# Patient Record
Sex: FEMALE | Race: BLACK OR AFRICAN AMERICAN | NOT HISPANIC OR LATINO | Employment: UNEMPLOYED | URBAN - METROPOLITAN AREA
[De-identification: names, ages, dates, MRNs, and addresses within clinical notes are randomized per-mention and may not be internally consistent; named-entity substitution may affect disease eponyms.]

---

## 2023-07-14 LAB
INR IN PPP BY COAGULATION ASSAY: 1.5 (ref 0.9–1.1)
PROTHROMBIN TIME (PT) IN PPP BY COAGULATION ASSAY: 16.6 SEC (ref 9.8–12.8)

## 2023-10-14 ENCOUNTER — APPOINTMENT (OUTPATIENT)
Dept: RADIOLOGY | Facility: HOSPITAL | Age: 48
End: 2023-10-14
Payer: COMMERCIAL

## 2023-10-14 ENCOUNTER — HOSPITAL ENCOUNTER (INPATIENT)
Facility: HOSPITAL | Age: 48
LOS: 6 days | Discharge: NURSING, REHAB OR LONG-TERM CARE | End: 2023-10-20
Attending: EMERGENCY MEDICINE | Admitting: INTERNAL MEDICINE
Payer: COMMERCIAL

## 2023-10-14 DIAGNOSIS — N30.00 ACUTE CYSTITIS WITHOUT HEMATURIA: ICD-10-CM

## 2023-10-14 DIAGNOSIS — M19.90 ARTHRITIS: ICD-10-CM

## 2023-10-14 DIAGNOSIS — I21.4 NSTEMI (NON-ST ELEVATED MYOCARDIAL INFARCTION) (MULTI): ICD-10-CM

## 2023-10-14 DIAGNOSIS — K59.09 OTHER CONSTIPATION: ICD-10-CM

## 2023-10-14 DIAGNOSIS — Z86.718 HISTORY OF DVT (DEEP VEIN THROMBOSIS): ICD-10-CM

## 2023-10-14 DIAGNOSIS — M79.606 PAIN OF LOWER EXTREMITY, UNSPECIFIED LATERALITY: ICD-10-CM

## 2023-10-14 DIAGNOSIS — I50.9 ACUTE ON CHRONIC CONGESTIVE HEART FAILURE, UNSPECIFIED HEART FAILURE TYPE: ICD-10-CM

## 2023-10-14 DIAGNOSIS — M79.604 PAIN IN BOTH LOWER EXTREMITIES: ICD-10-CM

## 2023-10-14 DIAGNOSIS — F31.70 BIPOLAR AFFECTIVE DISORDER IN REMISSION (MULTI): ICD-10-CM

## 2023-10-14 DIAGNOSIS — R60.0 LOCALIZED EDEMA: ICD-10-CM

## 2023-10-14 DIAGNOSIS — M79.605 PAIN IN BOTH LOWER EXTREMITIES: ICD-10-CM

## 2023-10-14 LAB
ALBUMIN SERPL BCP-MCNC: 4 G/DL (ref 3.4–5)
ALP SERPL-CCNC: 80 U/L (ref 33–110)
ALT SERPL W P-5'-P-CCNC: 12 U/L (ref 7–45)
ANION GAP BLDV CALCULATED.4IONS-SCNC: 10 MMOL/L (ref 10–25)
ANION GAP SERPL CALC-SCNC: 12 MMOL/L (ref 10–20)
AST SERPL W P-5'-P-CCNC: 13 U/L (ref 9–39)
BASE EXCESS BLDV CALC-SCNC: 2.4 MMOL/L (ref -2–3)
BASOPHILS # BLD AUTO: 0.02 X10*3/UL (ref 0–0.1)
BASOPHILS NFR BLD AUTO: 0.4 %
BILIRUB SERPL-MCNC: 0.7 MG/DL (ref 0–1.2)
BNP SERPL-MCNC: <2 PG/ML (ref 0–99)
BODY TEMPERATURE: 37 DEGREES CELSIUS
BUN SERPL-MCNC: 14 MG/DL (ref 6–23)
CA-I BLDV-SCNC: 1.26 MMOL/L (ref 1.1–1.33)
CALCIUM SERPL-MCNC: 9.6 MG/DL (ref 8.6–10.6)
CARDIAC TROPONIN I PNL SERPL HS: 10 NG/L (ref 0–34)
CHLORIDE BLDV-SCNC: 107 MMOL/L (ref 98–107)
CHLORIDE SERPL-SCNC: 109 MMOL/L (ref 98–107)
CO2 SERPL-SCNC: 25 MMOL/L (ref 21–32)
CREAT SERPL-MCNC: 0.81 MG/DL (ref 0.5–1.05)
D DIMER PPP FEU-MCNC: 1135 NG/ML FEU
EOSINOPHIL # BLD AUTO: 0.14 X10*3/UL (ref 0–0.7)
EOSINOPHIL NFR BLD AUTO: 3.1 %
ERYTHROCYTE [DISTWIDTH] IN BLOOD BY AUTOMATED COUNT: 14.1 % (ref 11.5–14.5)
GFR SERPL CREATININE-BSD FRML MDRD: 90 ML/MIN/1.73M*2
GLUCOSE BLDV-MCNC: 112 MG/DL (ref 74–99)
GLUCOSE SERPL-MCNC: 106 MG/DL (ref 74–99)
HCO3 BLDV-SCNC: 28.8 MMOL/L (ref 22–26)
HCT VFR BLD AUTO: 38 % (ref 36–46)
HCT VFR BLD EST: 38 % (ref 36–46)
HGB BLD-MCNC: 12.5 G/DL (ref 12–16)
HGB BLDV-MCNC: 12.7 G/DL (ref 12–16)
IMM GRANULOCYTES # BLD AUTO: 0.01 X10*3/UL (ref 0–0.7)
IMM GRANULOCYTES NFR BLD AUTO: 0.2 % (ref 0–0.9)
LACTATE BLDV-SCNC: 0.9 MMOL/L (ref 0.4–2)
LYMPHOCYTES # BLD AUTO: 1.15 X10*3/UL (ref 1.2–4.8)
LYMPHOCYTES NFR BLD AUTO: 25.7 %
MCH RBC QN AUTO: 30 PG (ref 26–34)
MCHC RBC AUTO-ENTMCNC: 32.9 G/DL (ref 32–36)
MCV RBC AUTO: 91 FL (ref 80–100)
MONOCYTES # BLD AUTO: 0.4 X10*3/UL (ref 0.1–1)
MONOCYTES NFR BLD AUTO: 8.9 %
NEUTROPHILS # BLD AUTO: 2.76 X10*3/UL (ref 1.2–7.7)
NEUTROPHILS NFR BLD AUTO: 61.7 %
NRBC BLD-RTO: 0 /100 WBCS (ref 0–0)
OXYHGB MFR BLDV: 49.9 % (ref 45–75)
PCO2 BLDV: 51 MM HG (ref 41–51)
PH BLDV: 7.36 PH (ref 7.33–7.43)
PLATELET # BLD AUTO: 242 X10*3/UL (ref 150–450)
PMV BLD AUTO: 8.8 FL (ref 7.5–11.5)
PO2 BLDV: 34 MM HG (ref 35–45)
POTASSIUM BLDV-SCNC: 3.7 MMOL/L (ref 3.5–5.3)
POTASSIUM SERPL-SCNC: 3.6 MMOL/L (ref 3.5–5.3)
PROT SERPL-MCNC: 6.9 G/DL (ref 6.4–8.2)
RBC # BLD AUTO: 4.16 X10*6/UL (ref 4–5.2)
SAO2 % BLDV: 51 % (ref 45–75)
SODIUM BLDV-SCNC: 142 MMOL/L (ref 136–145)
SODIUM SERPL-SCNC: 142 MMOL/L (ref 136–145)
WBC # BLD AUTO: 4.5 X10*3/UL (ref 4.4–11.3)

## 2023-10-14 PROCEDURE — 99285 EMERGENCY DEPT VISIT HI MDM: CPT | Mod: 25

## 2023-10-14 PROCEDURE — 85379 FIBRIN DEGRADATION QUANT: CPT

## 2023-10-14 PROCEDURE — 80053 COMPREHEN METABOLIC PANEL: CPT

## 2023-10-14 PROCEDURE — 2550000001 HC RX 255 CONTRASTS: Mod: SE | Performed by: EMERGENCY MEDICINE

## 2023-10-14 PROCEDURE — 2500000004 HC RX 250 GENERAL PHARMACY W/ HCPCS (ALT 636 FOR OP/ED)

## 2023-10-14 PROCEDURE — 36415 COLL VENOUS BLD VENIPUNCTURE: CPT

## 2023-10-14 PROCEDURE — 1210000001 HC SEMI-PRIVATE ROOM DAILY

## 2023-10-14 PROCEDURE — 84132 ASSAY OF SERUM POTASSIUM: CPT | Mod: CMCLAB

## 2023-10-14 PROCEDURE — 71275 CT ANGIOGRAPHY CHEST: CPT | Mod: FR

## 2023-10-14 PROCEDURE — 84484 ASSAY OF TROPONIN QUANT: CPT

## 2023-10-14 PROCEDURE — 80307 DRUG TEST PRSMV CHEM ANLYZR: CPT

## 2023-10-14 PROCEDURE — 81025 URINE PREGNANCY TEST: CPT

## 2023-10-14 PROCEDURE — 93010 ELECTROCARDIOGRAM REPORT: CPT | Performed by: PHYSICIAN ASSISTANT

## 2023-10-14 PROCEDURE — 85025 COMPLETE CBC W/AUTO DIFF WBC: CPT

## 2023-10-14 PROCEDURE — 83880 ASSAY OF NATRIURETIC PEPTIDE: CPT

## 2023-10-14 PROCEDURE — 71045 X-RAY EXAM CHEST 1 VIEW: CPT | Mod: FOREIGN READ | Performed by: RADIOLOGY

## 2023-10-14 PROCEDURE — 83735 ASSAY OF MAGNESIUM: CPT | Mod: CMCLAB

## 2023-10-14 PROCEDURE — 71045 X-RAY EXAM CHEST 1 VIEW: CPT | Mod: FR

## 2023-10-14 PROCEDURE — 71275 CT ANGIOGRAPHY CHEST: CPT | Mod: FOREIGN READ | Performed by: RADIOLOGY

## 2023-10-14 PROCEDURE — 99285 EMERGENCY DEPT VISIT HI MDM: CPT | Mod: 25 | Performed by: EMERGENCY MEDICINE

## 2023-10-14 PROCEDURE — 99285 EMERGENCY DEPT VISIT HI MDM: CPT | Performed by: EMERGENCY MEDICINE

## 2023-10-14 PROCEDURE — 96374 THER/PROPH/DIAG INJ IV PUSH: CPT

## 2023-10-14 PROCEDURE — 2500000001 HC RX 250 WO HCPCS SELF ADMINISTERED DRUGS (ALT 637 FOR MEDICARE OP)

## 2023-10-14 PROCEDURE — 84132 ASSAY OF SERUM POTASSIUM: CPT

## 2023-10-14 RX ORDER — ENOXAPARIN SODIUM 150 MG/ML
0.7 INJECTION SUBCUTANEOUS EVERY 12 HOURS
COMMUNITY
End: 2023-10-14 | Stop reason: SDUPTHER

## 2023-10-14 RX ORDER — TALC
6 POWDER (GRAM) TOPICAL NIGHTLY
Status: DISCONTINUED | OUTPATIENT
Start: 2023-10-14 | End: 2023-10-20 | Stop reason: HOSPADM

## 2023-10-14 RX ORDER — TORSEMIDE 20 MG/1
20 TABLET ORAL DAILY
Status: DISCONTINUED | OUTPATIENT
Start: 2023-10-15 | End: 2023-10-19

## 2023-10-14 RX ORDER — ONDANSETRON HYDROCHLORIDE 2 MG/ML
4 INJECTION, SOLUTION INTRAVENOUS ONCE
Status: COMPLETED | OUTPATIENT
Start: 2023-10-14 | End: 2023-10-14

## 2023-10-14 RX ORDER — METOCLOPRAMIDE HYDROCHLORIDE 5 MG/ML
INJECTION INTRAMUSCULAR; INTRAVENOUS
Status: COMPLETED
Start: 2023-10-14 | End: 2023-10-14

## 2023-10-14 RX ORDER — BUPROPION HYDROCHLORIDE 150 MG/1
150 TABLET ORAL DAILY
Status: DISCONTINUED | OUTPATIENT
Start: 2023-10-15 | End: 2023-10-20 | Stop reason: HOSPADM

## 2023-10-14 RX ORDER — TORSEMIDE 20 MG/1
20 TABLET ORAL DAILY
Status: DISCONTINUED | OUTPATIENT
Start: 2023-10-15 | End: 2023-10-14

## 2023-10-14 RX ORDER — HYDROXYZINE HYDROCHLORIDE 25 MG/1
25 TABLET, FILM COATED ORAL EVERY 6 HOURS PRN
Status: DISCONTINUED | OUTPATIENT
Start: 2023-10-14 | End: 2023-10-20 | Stop reason: HOSPADM

## 2023-10-14 RX ORDER — OXCARBAZEPINE 300 MG/1
300 TABLET, FILM COATED ORAL DAILY
COMMUNITY
Start: 2022-10-20 | End: 2023-10-14 | Stop reason: SDUPTHER

## 2023-10-14 RX ORDER — TALC
6 POWDER (GRAM) TOPICAL NIGHTLY
Status: ON HOLD | COMMUNITY
End: 2023-10-20 | Stop reason: SDUPTHER

## 2023-10-14 RX ORDER — ARIPIPRAZOLE 10 MG/1
10 TABLET ORAL DAILY
Status: DISCONTINUED | OUTPATIENT
Start: 2023-10-15 | End: 2023-10-20 | Stop reason: HOSPADM

## 2023-10-14 RX ORDER — BUPROPION HYDROCHLORIDE 150 MG/1
150 TABLET ORAL DAILY
Status: ON HOLD | COMMUNITY
Start: 2022-06-09 | End: 2023-10-20 | Stop reason: SDUPTHER

## 2023-10-14 RX ORDER — DOCUSATE SODIUM 100 MG/1
100 CAPSULE, LIQUID FILLED ORAL 2 TIMES DAILY PRN
COMMUNITY
Start: 2023-07-12 | End: 2023-10-20 | Stop reason: HOSPADM

## 2023-10-14 RX ORDER — CARVEDILOL 3.12 MG/1
3.12 TABLET ORAL
Status: DISCONTINUED | OUTPATIENT
Start: 2023-10-15 | End: 2023-10-20 | Stop reason: HOSPADM

## 2023-10-14 RX ORDER — OXCARBAZEPINE 300 MG/1
300 TABLET, FILM COATED ORAL NIGHTLY
Status: DISCONTINUED | OUTPATIENT
Start: 2023-10-14 | End: 2023-10-20 | Stop reason: HOSPADM

## 2023-10-14 RX ORDER — GABAPENTIN 300 MG/1
300 CAPSULE ORAL EVERY 12 HOURS
COMMUNITY
Start: 2023-09-20 | End: 2023-10-20 | Stop reason: HOSPADM

## 2023-10-14 RX ORDER — WARFARIN 10 MG/1
15 TABLET ORAL
COMMUNITY
Start: 2023-10-01 | End: 2023-10-20 | Stop reason: HOSPADM

## 2023-10-14 RX ORDER — GABAPENTIN 300 MG/1
300 CAPSULE ORAL EVERY 12 HOURS
Status: DISCONTINUED | OUTPATIENT
Start: 2023-10-14 | End: 2023-10-17

## 2023-10-14 RX ORDER — ONDANSETRON HYDROCHLORIDE 2 MG/ML
INJECTION, SOLUTION INTRAVENOUS
Status: COMPLETED
Start: 2023-10-14 | End: 2023-10-14

## 2023-10-14 RX ORDER — CARVEDILOL 3.12 MG/1
3.12 TABLET ORAL
Status: ON HOLD | COMMUNITY
Start: 2022-08-26 | End: 2023-10-20 | Stop reason: SDUPTHER

## 2023-10-14 RX ORDER — WARFARIN 10 MG/1
10 TABLET ORAL DAILY
Status: DISCONTINUED | OUTPATIENT
Start: 2023-10-15 | End: 2023-10-19

## 2023-10-14 RX ORDER — ATORVASTATIN CALCIUM 40 MG/1
40 TABLET, FILM COATED ORAL DAILY
COMMUNITY
End: 2023-10-20 | Stop reason: HOSPADM

## 2023-10-14 RX ORDER — ACETAMINOPHEN 325 MG/1
975 TABLET ORAL EVERY 8 HOURS PRN
Status: DISCONTINUED | OUTPATIENT
Start: 2023-10-14 | End: 2023-10-20 | Stop reason: HOSPADM

## 2023-10-14 RX ORDER — ACETAMINOPHEN 325 MG/1
650 TABLET ORAL ONCE
Status: COMPLETED | OUTPATIENT
Start: 2023-10-14 | End: 2023-10-14

## 2023-10-14 RX ORDER — ATORVASTATIN CALCIUM 20 MG/1
40 TABLET, FILM COATED ORAL DAILY
Status: DISCONTINUED | OUTPATIENT
Start: 2023-10-15 | End: 2023-10-16

## 2023-10-14 RX ORDER — MORPHINE SULFATE 4 MG/ML
4 INJECTION INTRAVENOUS ONCE
Status: COMPLETED | OUTPATIENT
Start: 2023-10-14 | End: 2023-10-14

## 2023-10-14 RX ORDER — FUROSEMIDE 10 MG/ML
40 INJECTION INTRAMUSCULAR; INTRAVENOUS EVERY 12 HOURS
Status: COMPLETED | OUTPATIENT
Start: 2023-10-14 | End: 2023-10-17

## 2023-10-14 RX ORDER — SPIRONOLACTONE 25 MG/1
25 TABLET ORAL DAILY
Status: DISCONTINUED | OUTPATIENT
Start: 2023-10-15 | End: 2023-10-20 | Stop reason: HOSPADM

## 2023-10-14 RX ORDER — FUROSEMIDE 10 MG/ML
80 INJECTION INTRAMUSCULAR; INTRAVENOUS ONCE
Status: COMPLETED | OUTPATIENT
Start: 2023-10-14 | End: 2023-10-14

## 2023-10-14 RX ORDER — METOCLOPRAMIDE HYDROCHLORIDE 5 MG/ML
10 INJECTION INTRAMUSCULAR; INTRAVENOUS ONCE
Status: COMPLETED | OUTPATIENT
Start: 2023-10-14 | End: 2023-10-14

## 2023-10-14 RX ORDER — TORSEMIDE 20 MG/1
20 TABLET ORAL DAILY
Status: ON HOLD | COMMUNITY
Start: 2022-10-21 | End: 2023-10-20 | Stop reason: SDUPTHER

## 2023-10-14 RX ORDER — POTASSIUM CHLORIDE 14.9 MG/ML
20 INJECTION INTRAVENOUS ONCE
Status: COMPLETED | OUTPATIENT
Start: 2023-10-14 | End: 2023-10-15

## 2023-10-14 RX ORDER — ARIPIPRAZOLE 10 MG/1
10 TABLET ORAL DAILY
Status: ON HOLD | COMMUNITY
Start: 2023-09-21 | End: 2023-10-20 | Stop reason: SDUPTHER

## 2023-10-14 RX ORDER — CLOPIDOGREL BISULFATE 75 MG/1
75 TABLET ORAL DAILY
Status: ON HOLD | COMMUNITY
Start: 2023-06-10 | End: 2023-10-20 | Stop reason: SDUPTHER

## 2023-10-14 RX ORDER — ALBUTEROL SULFATE 90 UG/1
2 INHALANT RESPIRATORY (INHALATION) EVERY 6 HOURS PRN
Status: DISCONTINUED | OUTPATIENT
Start: 2023-10-14 | End: 2023-10-20 | Stop reason: HOSPADM

## 2023-10-14 RX ORDER — CLOPIDOGREL BISULFATE 75 MG/1
75 TABLET ORAL DAILY
Status: DISCONTINUED | OUTPATIENT
Start: 2023-10-15 | End: 2023-10-20 | Stop reason: HOSPADM

## 2023-10-14 RX ORDER — ENOXAPARIN SODIUM 150 MG/ML
1 INJECTION SUBCUTANEOUS EVERY 12 HOURS
Status: DISCONTINUED | OUTPATIENT
Start: 2023-10-14 | End: 2023-10-20 | Stop reason: HOSPADM

## 2023-10-14 RX ORDER — ALBUTEROL SULFATE 90 UG/1
2 INHALANT RESPIRATORY (INHALATION) EVERY 6 HOURS PRN
Status: ON HOLD | COMMUNITY
Start: 2018-06-20 | End: 2023-10-20 | Stop reason: SDUPTHER

## 2023-10-14 RX ORDER — HYDROXYZINE HYDROCHLORIDE 25 MG/1
25 TABLET, FILM COATED ORAL EVERY 6 HOURS PRN
COMMUNITY
Start: 2023-09-20 | End: 2023-10-20 | Stop reason: HOSPADM

## 2023-10-14 RX ORDER — SPIRONOLACTONE 25 MG/1
25 TABLET ORAL DAILY
Status: ON HOLD | COMMUNITY
Start: 2022-06-09 | End: 2023-10-20 | Stop reason: SDUPTHER

## 2023-10-14 RX ADMIN — POTASSIUM CHLORIDE 20 MEQ: 14.9 INJECTION, SOLUTION INTRAVENOUS at 22:56

## 2023-10-14 RX ADMIN — MORPHINE SULFATE 4 MG: 4 INJECTION INTRAVENOUS at 20:05

## 2023-10-14 RX ADMIN — MELATONIN 6 MG: 3 TAB ORAL at 22:55

## 2023-10-14 RX ADMIN — METOCLOPRAMIDE HYDROCHLORIDE 10 MG: 5 INJECTION INTRAMUSCULAR; INTRAVENOUS at 20:47

## 2023-10-14 RX ADMIN — ONDANSETRON HYDROCHLORIDE 4 MG: 2 INJECTION, SOLUTION INTRAVENOUS at 20:25

## 2023-10-14 RX ADMIN — METOCLOPRAMIDE 10 MG: 5 INJECTION, SOLUTION INTRAMUSCULAR; INTRAVENOUS at 20:47

## 2023-10-14 RX ADMIN — ACETAMINOPHEN 650 MG: 325 TABLET ORAL at 13:04

## 2023-10-14 RX ADMIN — GABAPENTIN 300 MG: 300 CAPSULE ORAL at 22:55

## 2023-10-14 RX ADMIN — ONDANSETRON 4 MG: 2 INJECTION INTRAMUSCULAR; INTRAVENOUS at 20:06

## 2023-10-14 RX ADMIN — FUROSEMIDE 80 MG: 10 INJECTION, SOLUTION INTRAVENOUS at 11:38

## 2023-10-14 RX ADMIN — ENOXAPARIN SODIUM 120 MG: 150 INJECTION SUBCUTANEOUS at 20:48

## 2023-10-14 RX ADMIN — ONDANSETRON 4 MG: 2 INJECTION INTRAMUSCULAR; INTRAVENOUS at 20:25

## 2023-10-14 RX ADMIN — ACETAMINOPHEN 975 MG: 325 TABLET ORAL at 22:55

## 2023-10-14 RX ADMIN — IOHEXOL 90 ML: 350 INJECTION, SOLUTION INTRAVENOUS at 12:57

## 2023-10-14 RX ADMIN — FUROSEMIDE 40 MG: 10 INJECTION, SOLUTION INTRAVENOUS at 22:56

## 2023-10-14 RX ADMIN — OXCARBAZEPINE 300 MG: 300 TABLET, FILM COATED ORAL at 23:27

## 2023-10-14 ASSESSMENT — ENCOUNTER SYMPTOMS
HEADACHES: 0
NECK PAIN: 0
VOMITING: 0
DIARRHEA: 0
ABDOMINAL PAIN: 0
FEVER: 0
FATIGUE: 0
RHINORRHEA: 0
TROUBLE SWALLOWING: 0
CONSTIPATION: 0
TREMORS: 0
DIZZINESS: 0
COLOR CHANGE: 0
FLANK PAIN: 0
STRIDOR: 0
SORE THROAT: 0
SHORTNESS OF BREATH: 1
ABDOMINAL DISTENTION: 0
BACK PAIN: 0
FACIAL ASYMMETRY: 0
WHEEZING: 0
NAUSEA: 0

## 2023-10-14 ASSESSMENT — PAIN DESCRIPTION - ORIENTATION: ORIENTATION: RIGHT;LEFT

## 2023-10-14 ASSESSMENT — PAIN DESCRIPTION - ONSET: ONSET: ONGOING

## 2023-10-14 ASSESSMENT — PAIN DESCRIPTION - DESCRIPTORS: DESCRIPTORS: ACHING;SHARP

## 2023-10-14 ASSESSMENT — COLUMBIA-SUICIDE SEVERITY RATING SCALE - C-SSRS
1. IN THE PAST MONTH, HAVE YOU WISHED YOU WERE DEAD OR WISHED YOU COULD GO TO SLEEP AND NOT WAKE UP?: NO
2. HAVE YOU ACTUALLY HAD ANY THOUGHTS OF KILLING YOURSELF?: NO
6. HAVE YOU EVER DONE ANYTHING, STARTED TO DO ANYTHING, OR PREPARED TO DO ANYTHING TO END YOUR LIFE?: NO

## 2023-10-14 ASSESSMENT — PAIN SCALES - GENERAL
PAINLEVEL_OUTOF10: 10 - WORST POSSIBLE PAIN
PAINLEVEL_OUTOF10: 8
PAINLEVEL_OUTOF10: 10 - WORST POSSIBLE PAIN

## 2023-10-14 ASSESSMENT — PAIN DESCRIPTION - FREQUENCY: FREQUENCY: CONSTANT/CONTINUOUS

## 2023-10-14 ASSESSMENT — PAIN DESCRIPTION - PAIN TYPE: TYPE: ACUTE PAIN

## 2023-10-14 ASSESSMENT — PAIN - FUNCTIONAL ASSESSMENT: PAIN_FUNCTIONAL_ASSESSMENT: 0-10

## 2023-10-14 ASSESSMENT — PAIN DESCRIPTION - LOCATION: LOCATION: LEG

## 2023-10-14 NOTE — ED PROVIDER NOTES
HPI   Chief Complaint   Patient presents with    Leg Pain    Leg Swelling     Patient reports to the ED via [facility] d/t leg swelling and pain. Patient states she has a hx of CHF and DVT's. Her legs have been swollen and weeping for 4 days and are painful. Patient also complains of Head ache but denies dizziness, N/V, CP,SOB.       48-year-old female presenting to the emergency department with concern for leg swelling and shortness of breath.  Patient has a history of CHF and DVTs.  Has noticed an increase in swelling over the past 4 days, is complaining of weeping fluid and painful swelling of the bilateral lower extremities.  States she is supposed to be on anticoagulation but due to family dynamics as well as insurance concerns, patient has not been taking her scheduled medications for some time.  Patient is also endorsing shortness of breath as she feels fluid overloaded.  Patient states she has not been able to walk because the pain and from swelling is severe as well as worsening heaviness.  Denies lightheadedness, vision changes.  Is complaining of a headache.  Denies fever, chills, chest pain, no nausea vomiting or diarrhea.  No abdominal pain.  No urinary symptoms including blood in the urine or dysuria, no blood in the stool.  Patient states she is not able to ambulate to the bathroom and has urinated on herself because her legs are too swollen and painful to use.                          [place] Coma Scale Score: 15                  Patient History   No past medical history on file.  Past Surgical History:   Procedure Laterality Date    CT ABDOMEN PELVIS ANGIOGRAM W AND/OR WO IV CONTRAST  12/11/2021    CT ABDOMEN PELVIS ANGIOGRAM W AND/OR WO IV CONTRAST 12/11/2021 [facility] [place] [facility]    CT ANGIO HEART CORONARY  11/30/2021    CT HEART CORONARY ANGIOGRAM 11/30/2021 [facility] [place] [facility]     No family history on file.  Social History     Tobacco Use    Smoking status: Not on file    Smokeless tobacco: Not on  file   Substance Use Topics    Alcohol use: Not on file    Drug use: Not on file       Physical Exam   ED Triage Vitals [10/14/23 0915]   Temp Heart Rate Resp BP   36.6 °C (97.9 °F) 74 16 119/77      SpO2 Temp Source Heart Rate Source Patient Position   96 % Oral Monitor Sitting      BP Location FiO2 (%)     Left arm --       Physical Exam  Vitals and nursing note reviewed.   Constitutional:       General: She is not in acute distress.     Appearance: Normal appearance. She is well-developed. She is not toxic-appearing.   HENT:      Head: Normocephalic and atraumatic.      Mouth/Throat:      Mouth: Mucous membranes are moist.   Eyes:      Conjunctiva/sclera: Conjunctivae normal.      Pupils: Pupils are equal, round, and reactive to light.   Cardiovascular:      Rate and Rhythm: Normal rate and regular rhythm.      Pulses:           Radial pulses are 2+ on the right side and 2+ on the left side.      Heart sounds: No murmur heard.  Pulmonary:      Effort: Pulmonary effort is normal. No respiratory distress.      Breath sounds: Normal breath sounds.   Abdominal:      General: Abdomen is flat.      Palpations: Abdomen is soft.      Tenderness: There is no abdominal tenderness. There is no guarding or rebound.   Musculoskeletal:         General: Tenderness present. No swelling.      Cervical back: Normal range of motion and neck supple.      Right lower leg: Edema present.      Left lower leg: Edema present.      Comments: 3+ pitting edema bilaterally   Skin:     General: Skin is warm and dry.      Capillary Refill: Capillary refill takes less than 2 seconds.   Neurological:      Mental Status: She is alert and oriented to person, place, and time.   Psychiatric:         Mood and Affect: Mood normal.         ED Course & MDM   ED Course as of 10/14/23 1444   Sat Oct 14, 2023   1410 VBG within normal limits, troponin and BNP within normal limits.  CBC unremarkable, CMP unremarkable.  D-dimer elevated to 1135. [PW]   1412  Chest x-ray negative for evidence of interstitial edema or fluid overload. [PW]   1412 CT PE study negative for any evidence of pneumonia, consolidation, pulmonary embolism. [PW]      ED Course User Index  [PW] Yolanda Young DO         Diagnoses as of 10/14/23 1444   Acute on chronic congestive heart failure, unspecified heart failure type (CMS/HCC)       Medical Decision Making  This is a 47 y/o female patient with history of heart failure, presenting with likely exacerbation of CHF causing volume overload and bilateral LE edema. The etiology of the decompensation is not certain but is likely due to lack of access to medicine. Alternative etiologies I considered include cardiac (ACS, valvular disease, arrhythmia, myocarditis/endocarditis, dissection) however given unremarkable trop, ekg, cardiac exam have low suspicion. Also considered but low risk for respiratory cause (COPD, asthma, PE, or PNA), or dietary indiscretion, alcohol or drug abuse, endocrine (thyrotoxicosis). The patient was given lasix 80 mg IV and admitted for acute management of CHF exacerbation.  Lab work was largely unremarkable however she normally should be taking anticoagulation as she has a risk factors for DVT and PE but has not been on her medications due to to social related factors.  Dimer was 1135 so a CT PE study was ordered which was negative for pulmonary edema, PE, evidence of consolidation or pneumonia.  No pleural effusions.  Given her lack of access to medication and follow-up care, patient admitted for IV diuresis, and further management.        Procedure  Procedures     Yolanda Young DO  Resident  10/14/23 1444

## 2023-10-14 NOTE — H&P
History Of Present Illness  Manuel Palmer is a 48 y.o. female with PMH of CHF, previous TIA/NSTEMI, DVTs/PE (on Warfarin, Lovenox and Plavix), Hx of cocaine abuse, ?schizoaffective/bipolar disease, seizure, HTN, DM, DLD, COPD, presenting to ED on 10/14 with shortness of breath and bilateral leg swelling and pain for 4 days. She did have these symptoms for 6 months, but they gradually worsened to the extent where she couldn't handle it. The pain was over the bilateral lower legs, scaled as 10 out of 10, but not responsive to Tylenol. She felt short of breath when moving around at baseline, but the condition worsened over the past 4 days as well. She reported family not taking care of her, leading to her not taking medications for the past 3 weeks as she ran out. She denied chest pain, dizziness, headache, nausea, vomiting, diarrhea, and constipation.     She reported an experience of TIA and STEMI diagnosed in [place], [place] 3 years ago (cheart review revealed NSTEMI). Her initial presentation was left chest pain, tingling in the left arm, and left facial drooping. Heart cath showed 50 % blockage in the heart vessels. No surgery or stent was placed then.     ED intervention:  - Lasix 80 mg  - Tylenol 650 mg       Past Medical History  No past medical history on file.    CHF, diagnosed over 30 years ago (when 15-17 yo)  Previous TIA/STEMI, 3 years ago  DVTs/PE on Warfarin, Lovenox, and Plavix  Cocaine abuse  Bipolar disease  Seizure   HTN  DM  DLD  COPD    Surgical History  Past Surgical History:   Procedure Laterality Date    CT ABDOMEN PELVIS ANGIOGRAM W AND/OR WO IV CONTRAST  12/11/2021    CT ABDOMEN PELVIS ANGIOGRAM W AND/OR WO IV CONTRAST [place] [facility]    CT ANGIO HEART CORONARY  11/30/2021    CT HEART CORONARY ANGIOGRAM [place]/2021 [facility]     Appendectomy  Cholecystectomy  Caesarean section  Hernia removal, when she was young  bunionectomy       Social History  She has no  history on file for tobacco use, alcohol use, and drug use.    Alcohol: occasionally  Tobacco: quitted months ago, 66 pack-years  Drug: denied, cocaine abuse history    Family History  No family history on file.    Mom: HTN, DM, heart disease  Dad: DM,  of lung cancer     Allergies  Aspirin and Haldol [haloperidol]    Review of Systems   Constitutional:  Negative for fatigue and fever.   HENT:  Negative for congestion, rhinorrhea, sore throat and trouble swallowing.    Respiratory:  Positive for shortness of breath. Negative for wheezing and stridor.    Cardiovascular:  Positive for leg swelling. Negative for chest pain.   Gastrointestinal:  Negative for abdominal distention, abdominal pain, constipation, diarrhea, nausea and vomiting.   Genitourinary:  Negative for flank pain.   Musculoskeletal:  Negative for back pain and neck pain.   Skin:  Negative for color change and rash.   Neurological:  Negative for dizziness, tremors, facial asymmetry and headaches.        Physical Exam  Constitutional:       Appearance: She is obese.   HENT:      Mouth/Throat:      Mouth: Mucous membranes are moist.   Eyes:      General: No scleral icterus.     Extraocular Movements: Extraocular movements intact.      Pupils: Pupils are equal, round, and reactive to light.   Cardiovascular:      Rate and Rhythm: Normal rate and regular rhythm.      Heart sounds: No murmur heard.     No friction rub. No gallop.   Pulmonary:      Breath sounds: No stridor. No wheezing, rhonchi or rales.   Abdominal:      Palpations: Abdomen is soft.      Tenderness: There is no guarding or rebound.   Musculoskeletal:         General: Swelling and tenderness present.      Right lower le+ Pitting Edema present.      Left lower le+ Pitting Edema present.   Skin:     Coloration: Skin is not pale.      Findings: No rash.   Neurological:      Mental Status: She is alert and oriented to person, place, and time.   Psychiatric:         Mood and Affect:  "Mood normal.         Behavior: Behavior normal.     Home Medications  atorvastatin 40 mg PO daily  ARIPiprazole 10 mg PO daily  buPROPion 150 mg PO daily  melatonin 6 mg PO daily  carvedilol 3.125 mg PO BID  spironolactone 25 mg PO daily  OXcarbazepine 300 mg PO daily  clopidogrel 75 mg PO daily  torsemide 20 mg PO daily  warfarin (COUMADIN) 6 mg PO daily  enoxaparin (LOVENOX) 100mg/mL SC injection 2 mL q12h    albuterol 90 mcg IH q6h PRN  docusate sodium 100 mg PO BID PRN  baclofen 10 mg PO TID PRN  gabapentin 300 mg PO q12h PRN  hydrOXYzine HC 25 mg PO q6h PRN  traMADol 50 mg q6h PRN       Last Recorded Vitals  Blood pressure 119/77, pulse 74, temperature 36.6 °C (97.9 °F), temperature source Oral, resp. rate 16, height 1.753 m (5' 9\"), weight 122 kg (270 lb), SpO2 96 %.    Relevant Results  Results for orders placed or performed during the hospital encounter of 10/14/23 (from the past 24 hour(s))   Blood Gas Venous Full Panel Unsolicited   Result Value Ref Range    POCT pH, Venous 7.36 7.33 - 7.43 pH    POCT pCO2, Venous 51 41 - 51 mm Hg    POCT pO2, Venous 34 (L) 35 - 45 mm Hg    POCT SO2, Venous 51 45 - 75 %    POCT Oxy Hemoglobin, Venous 49.9 45.0 - 75.0 %    POCT Hematocrit Calculated, Venous 38.0 36.0 - 46.0 %    POCT Sodium, Venous 142 136 - 145 mmol/L    POCT Potassium, Venous 3.7 3.5 - 5.3 mmol/L    POCT Chloride, Venous 107 98 - 107 mmol/L    POCT Ionized Calicum, Venous 1.26 1.10 - 1.33 mmol/L    POCT Glucose, Venous 112 (H) 74 - 99 mg/dL    POCT Lactate, Venous 0.9 0.4 - 2.0 mmol/L    POCT Base Excess, Venous 2.4 -2.0 - 3.0 mmol/L    POCT HCO3 Calculated, Venous 28.8 (H) 22.0 - 26.0 mmol/L    POCT Hemoglobin, Venous 12.7 12.0 - 16.0 g/dL    POCT Anion Gap, Venous 10.0 10.0 - 25.0 mmol/L    Patient Temperature 37.0 degrees Celsius   CBC and Auto Differential   Result Value Ref Range    WBC 4.5 4.4 - 11.3 x10*3/uL    nRBC 0.0 0.0 - 0.0 /100 WBCs    RBC 4.16 4.00 - 5.20 x10*6/uL    Hemoglobin 12.5 12.0 - " 16.0 g/dL    Hematocrit 38.0 36.0 - 46.0 %    MCV 91 80 - 100 fL    MCH 30.0 26.0 - 34.0 pg    MCHC 32.9 32.0 - 36.0 g/dL    RDW 14.1 11.5 - 14.5 %    Platelets 242 150 - 450 x10*3/uL    MPV 8.8 7.5 - 11.5 fL    Neutrophils % 61.7 40.0 - 80.0 %    Immature Granulocytes %, Automated 0.2 0.0 - 0.9 %    Lymphocytes % 25.7 13.0 - 44.0 %    Monocytes % 8.9 2.0 - 10.0 %    Eosinophils % 3.1 0.0 - 6.0 %    Basophils % 0.4 0.0 - 2.0 %    Neutrophils Absolute 2.76 1.20 - 7.70 x10*3/uL    Immature Granulocytes Absolute, Automated 0.01 0.00 - 0.70 x10*3/uL    Lymphocytes Absolute 1.15 (L) 1.20 - 4.80 x10*3/uL    Monocytes Absolute 0.40 0.10 - 1.00 x10*3/uL    Eosinophils Absolute 0.14 0.00 - 0.70 x10*3/uL    Basophils Absolute 0.02 0.00 - 0.10 x10*3/uL   Comprehensive metabolic panel   Result Value Ref Range    Glucose 106 (H) 74 - 99 mg/dL    Sodium 142 136 - 145 mmol/L    Potassium 3.6 3.5 - 5.3 mmol/L    Chloride 109 (H) 98 - 107 mmol/L    Bicarbonate 25 21 - 32 mmol/L    Anion Gap 12 10 - 20 mmol/L    Urea Nitrogen 14 6 - 23 mg/dL    Creatinine 0.81 0.50 - 1.05 mg/dL    eGFR 90 >60 mL/min/1.73m*2    Calcium 9.6 8.6 - 10.6 mg/dL    Albumin 4.0 3.4 - 5.0 g/dL    Alkaline Phosphatase 80 33 - 110 U/L    Total Protein 6.9 6.4 - 8.2 g/dL    AST 13 9 - 39 U/L    Bilirubin, Total 0.7 0.0 - 1.2 mg/dL    ALT 12 7 - 45 U/L   Troponin I, High Sensitivity   Result Value Ref Range    Troponin I, High Sensitivity 10 0 - 34 ng/L   B-Type Natriuretic Peptide   Result Value Ref Range    BNP <2 0 - 99 pg/mL   D-Dimer, Quantitative Non VTE   Result Value Ref Range    D-Dimer Non VTE, Quant (ng/mL FEU) 1,135 (H) <=500 ng/mL FEU     CT angio chest for pulmonary embolism  Result Date: 10/14/2023    FINDINGS: Pulmonary arteries are adequately opacified without acute or chronic filling defects.  The thoracic aorta is normal in course and caliber without dissection or aneurysm. The heart is normal in size without pericardial effusion.  Thoracic  lymph nodes are not enlarged. There is no pleural effusion, pleural thickening, or pneumothorax. The airways are patent. Lungs are clear without consolidation, interstitial disease, or suspicious nodules. Upper abdomen demonstrates no acute pathology. There are no acute fractures.  No suspicious bony lesions.    No pulmonary embolism or other acute intrathoracic process. Signed by Robin Kulkarni MD    XR chest 1 view  Result Date: 10/14/2023    FINDINGS: CARDIOMEDIASTINAL SILHOUETTE: Cardiomediastinal silhouette is normal in size and configuration.  LUNGS: Lungs are clear.  ABDOMEN: No remarkable upper abdominal findings.  BONES: No acute osseous changes.    No acute process. Signed by Robin Kulkarni MD         Assessment/Plan   Principal Problem:    Acute on chronic congestive heart failure, unspecified heart failure type (CMS/HCC)    Manuel Palmer is a 48-year-old female with the PMH of CHF (on Torsemide, Carvedilol, Spironolactone), previous TIA/NSTEMI, DVTs/PE (on Warfarin, Lovenox and Plavix), cocaine abuse, bipolar disease (on Aripiprazole & Oxcarbazepine), HTN,, DLD (on Atrovastatin), ?COPD, presenting to ED on 10/14 with shortness of breath and bilateral leg swelling and pain for 4 days. At ED, D-dimer was 1135. CXR was negative for pulmonary edema. CT PE showed no evidence of PE or PNA. Considering Patient's not taking medications for 3 weeks, the symptoms are most likely caused by acute exacerbation of CHF without medication control. Will diurese monitor I/Os     #bilateral lower leg edema  #sob  #CHF with ADHF  :: possibly caused by not taking medications, no other triggers identified, no ACS symptoms, no flu symptoms,   :: CXR was negative for pulmonary edema  :: CT PE showed no evidence of PE or PNA  -diuresis with lasix (received 80mg IV in the ED)  -resume home torsemide 20mg daily once improved   -hold home carvedilol 3.125mg BID for normal tension and concern for ADHF, consider resumption in  AM    #HFpEF  #CAD   #Hx of NSTEMI   -continue plavix (allergic to aspirin)   -continue atorvastatin 40mg daily   -hold home carvedilol 3.125mg BID for normal tension and concern for ADHF, consider resumption in AM  -continue spironolactone 25mg daily   -consider SGLT-2 inhibitor in the AM   -consider TTE (though will likely be technically difficult)       #Previous multiple DVTs/PE  - lovenox 1mg/kg BID (120mg BID)   - LMWH level (Anti-Xa) 4 hours after 3rd dose   - consider warfarin resumption in the AM   - consider vascular medicine consult     #Hyperlipidemia  - c/w home atorvastatin 40mg daily     #Bipolar disease/?Schizoaffective disorder   - c/w home aripiprazole  - c/w home oxcarbazepine  - c/w home buproprion     F: Diuresing  E: PRN  N: regular diet  A: PIV  DVT Ppx: Lovenox (therapeutic) given previous DVT/PE  GI Ppx: not indicated    Code status: FULL CODE  Surrogate decision maker: (Mom) Tejal [phone] (lives out of state). Patient stated does not want family members updated. However mom is SDM in case she looses capacity.            Nuno Alberts

## 2023-10-15 LAB
ALBUMIN SERPL BCP-MCNC: 3.4 G/DL (ref 3.4–5)
AMPHETAMINES UR QL SCN: ABNORMAL
ANION GAP SERPL CALC-SCNC: 12 MMOL/L (ref 10–20)
APTT PPP: 36 SECONDS (ref 27–38)
BARBITURATES UR QL SCN: ABNORMAL
BASOPHILS # BLD AUTO: 0.03 X10*3/UL (ref 0–0.1)
BASOPHILS NFR BLD AUTO: 0.7 %
BENZODIAZ UR QL SCN: ABNORMAL
BUN SERPL-MCNC: 13 MG/DL (ref 6–23)
BZE UR QL SCN: ABNORMAL
CALCIUM SERPL-MCNC: 9 MG/DL (ref 8.6–10.6)
CANNABINOIDS UR QL SCN: ABNORMAL
CHLORIDE SERPL-SCNC: 108 MMOL/L (ref 98–107)
CHOLEST SERPL-MCNC: 151 MG/DL (ref 0–199)
CHOLESTEROL/HDL RATIO: 4
CO2 SERPL-SCNC: 28 MMOL/L (ref 21–32)
CREAT SERPL-MCNC: 0.72 MG/DL (ref 0.5–1.05)
EOSINOPHIL # BLD AUTO: 0.18 X10*3/UL (ref 0–0.7)
EOSINOPHIL NFR BLD AUTO: 4.3 %
ERYTHROCYTE [DISTWIDTH] IN BLOOD BY AUTOMATED COUNT: 13.6 % (ref 11.5–14.5)
EST. AVERAGE GLUCOSE BLD GHB EST-MCNC: 114 MG/DL
FENTANYL+NORFENTANYL UR QL SCN: ABNORMAL
GFR SERPL CREATININE-BSD FRML MDRD: >90 ML/MIN/1.73M*2
GLUCOSE SERPL-MCNC: 95 MG/DL (ref 74–99)
HBA1C MFR BLD: 5.6 %
HCG UR QL IA.RAPID: NEGATIVE
HCT VFR BLD AUTO: 35.7 % (ref 36–46)
HDLC SERPL-MCNC: 37.3 MG/DL
HGB BLD-MCNC: 11.8 G/DL (ref 12–16)
IMM GRANULOCYTES # BLD AUTO: 0.04 X10*3/UL (ref 0–0.7)
IMM GRANULOCYTES NFR BLD AUTO: 1 % (ref 0–0.9)
INR PPP: 1 (ref 0.9–1.1)
LDLC SERPL CALC-MCNC: 103 MG/DL
LYMPHOCYTES # BLD AUTO: 1.37 X10*3/UL (ref 1.2–4.8)
LYMPHOCYTES NFR BLD AUTO: 32.8 %
MAGNESIUM SERPL-MCNC: 1.85 MG/DL (ref 1.6–2.4)
MAGNESIUM SERPL-MCNC: 2.43 MG/DL (ref 1.6–2.4)
MCH RBC QN AUTO: 30.4 PG (ref 26–34)
MCHC RBC AUTO-ENTMCNC: 33.1 G/DL (ref 32–36)
MCV RBC AUTO: 92 FL (ref 80–100)
MONOCYTES # BLD AUTO: 0.41 X10*3/UL (ref 0.1–1)
MONOCYTES NFR BLD AUTO: 9.8 %
NEUTROPHILS # BLD AUTO: 2.15 X10*3/UL (ref 1.2–7.7)
NEUTROPHILS NFR BLD AUTO: 51.4 %
NON HDL CHOLESTEROL: 114 MG/DL (ref 0–149)
NRBC BLD-RTO: 0 /100 WBCS (ref 0–0)
OPIATES UR QL SCN: ABNORMAL
OXYCODONE+OXYMORPHONE UR QL SCN: ABNORMAL
PCP UR QL SCN: ABNORMAL
PHOSPHATE SERPL-MCNC: 4.1 MG/DL (ref 2.5–4.9)
PLATELET # BLD AUTO: 240 X10*3/UL (ref 150–450)
PMV BLD AUTO: 9 FL (ref 7.5–11.5)
POTASSIUM SERPL-SCNC: 3.6 MMOL/L (ref 3.5–5.3)
PROTHROMBIN TIME: 11.3 SECONDS (ref 9.8–12.8)
RBC # BLD AUTO: 3.88 X10*6/UL (ref 4–5.2)
SODIUM SERPL-SCNC: 144 MMOL/L (ref 136–145)
TRIGL SERPL-MCNC: 54 MG/DL (ref 0–149)
VLDL: 11 MG/DL (ref 0–40)
WBC # BLD AUTO: 4.2 X10*3/UL (ref 4.4–11.3)

## 2023-10-15 PROCEDURE — 2500000004 HC RX 250 GENERAL PHARMACY W/ HCPCS (ALT 636 FOR OP/ED)

## 2023-10-15 PROCEDURE — 85025 COMPLETE CBC W/AUTO DIFF WBC: CPT

## 2023-10-15 PROCEDURE — 83036 HEMOGLOBIN GLYCOSYLATED A1C: CPT | Mod: CMCLAB

## 2023-10-15 PROCEDURE — 83735 ASSAY OF MAGNESIUM: CPT

## 2023-10-15 PROCEDURE — 2500000001 HC RX 250 WO HCPCS SELF ADMINISTERED DRUGS (ALT 637 FOR MEDICARE OP)

## 2023-10-15 PROCEDURE — 80061 LIPID PANEL: CPT

## 2023-10-15 PROCEDURE — 85610 PROTHROMBIN TIME: CPT

## 2023-10-15 PROCEDURE — 80069 RENAL FUNCTION PANEL: CPT | Mod: CMCLAB

## 2023-10-15 PROCEDURE — 1210000001 HC SEMI-PRIVATE ROOM DAILY

## 2023-10-15 PROCEDURE — 99223 1ST HOSP IP/OBS HIGH 75: CPT | Performed by: INTERNAL MEDICINE

## 2023-10-15 RX ORDER — OXYCODONE HYDROCHLORIDE 5 MG/1
2.5 TABLET ORAL ONCE
Status: COMPLETED | OUTPATIENT
Start: 2023-10-15 | End: 2023-10-15

## 2023-10-15 RX ORDER — MAGNESIUM SULFATE HEPTAHYDRATE 40 MG/ML
2 INJECTION, SOLUTION INTRAVENOUS ONCE
Status: COMPLETED | OUTPATIENT
Start: 2023-10-15 | End: 2023-10-15

## 2023-10-15 RX ORDER — POTASSIUM CHLORIDE 1.5 G/1.58G
20 POWDER, FOR SOLUTION ORAL ONCE
Status: COMPLETED | OUTPATIENT
Start: 2023-10-15 | End: 2023-10-15

## 2023-10-15 RX ADMIN — CLOPIDOGREL BISULFATE 75 MG: 75 TABLET ORAL at 08:13

## 2023-10-15 RX ADMIN — BUPROPION HYDROCHLORIDE 150 MG: 150 TABLET, EXTENDED RELEASE ORAL at 08:13

## 2023-10-15 RX ADMIN — FUROSEMIDE 40 MG: 10 INJECTION, SOLUTION INTRAVENOUS at 09:27

## 2023-10-15 RX ADMIN — ACETAMINOPHEN 975 MG: 325 TABLET ORAL at 11:51

## 2023-10-15 RX ADMIN — SPIRONOLACTONE 25 MG: 25 TABLET ORAL at 08:13

## 2023-10-15 RX ADMIN — ARIPIPRAZOLE 10 MG: 10 TABLET ORAL at 08:13

## 2023-10-15 RX ADMIN — MAGNESIUM SULFATE HEPTAHYDRATE 2 G: 40 INJECTION, SOLUTION INTRAVENOUS at 02:13

## 2023-10-15 RX ADMIN — OXYCODONE HYDROCHLORIDE 2.5 MG: 5 TABLET ORAL at 21:01

## 2023-10-15 RX ADMIN — POTASSIUM CHLORIDE 20 MEQ: 1.5 POWDER, FOR SOLUTION ORAL at 05:55

## 2023-10-15 RX ADMIN — ATORVASTATIN CALCIUM 40 MG: 20 TABLET, FILM COATED ORAL at 08:13

## 2023-10-15 RX ADMIN — ENOXAPARIN SODIUM 120 MG: 150 INJECTION SUBCUTANEOUS at 09:27

## 2023-10-15 RX ADMIN — MELATONIN 6 MG: 3 TAB ORAL at 22:04

## 2023-10-15 RX ADMIN — GABAPENTIN 300 MG: 300 CAPSULE ORAL at 22:04

## 2023-10-15 RX ADMIN — GABAPENTIN 300 MG: 300 CAPSULE ORAL at 09:27

## 2023-10-15 RX ADMIN — FUROSEMIDE 40 MG: 10 INJECTION, SOLUTION INTRAVENOUS at 22:45

## 2023-10-15 SDOH — HEALTH STABILITY: MENTAL HEALTH: HAVE YOU ACTUALLY HAD ANY THOUGHTS OF KILLING YOURSELF?: NO

## 2023-10-15 SDOH — ECONOMIC STABILITY: HOUSING INSECURITY
IN THE LAST 12 MONTHS, WAS THERE A TIME WHEN YOU DID NOT HAVE A STEADY PLACE TO SLEEP OR SLEPT IN A SHELTER (INCLUDING NOW)?: YES

## 2023-10-15 SDOH — HEALTH STABILITY: PHYSICAL HEALTH: ON AVERAGE, HOW MANY MINUTES DO YOU ENGAGE IN EXERCISE AT THIS LEVEL?: 20 MIN

## 2023-10-15 SDOH — HEALTH STABILITY: MENTAL HEALTH
DO YOU FEEL STRESS - TENSE, RESTLESS, NERVOUS, OR ANXIOUS, OR UNABLE TO SLEEP AT NIGHT BECAUSE YOUR MIND IS TROUBLED ALL THE TIME - THESE DAYS?: VERY MUCH

## 2023-10-15 SDOH — SOCIAL STABILITY: SOCIAL INSECURITY: ARE YOU MARRIED, WIDOWED, DIVORCED, SEPARATED, NEVER MARRIED, OR LIVING WITH A PARTNER?: NEVER MARRIED

## 2023-10-15 SDOH — SOCIAL STABILITY: SOCIAL NETWORK
DO YOU BELONG TO ANY CLUBS OR ORGANIZATIONS SUCH AS CHURCH GROUPS, UNIONS, FRATERNAL OR ATHLETIC GROUPS, OR SCHOOL GROUPS?: NO

## 2023-10-15 SDOH — HEALTH STABILITY: MENTAL HEALTH: HOW OFTEN DO YOU HAVE A DRINK CONTAINING ALCOHOL?: 2-4 TIMES A MONTH

## 2023-10-15 SDOH — ECONOMIC STABILITY: INCOME INSECURITY: IN THE PAST 12 MONTHS HAS THE ELECTRIC, GAS, OIL, OR WATER COMPANY THREATENED TO SHUT OFF SERVICES IN YOUR HOME?: NO

## 2023-10-15 SDOH — ECONOMIC STABILITY: FOOD INSECURITY: WITHIN THE PAST 12 MONTHS, THE FOOD YOU BOUGHT JUST DIDN'T LAST AND YOU DIDN'T HAVE MONEY TO GET MORE.: OFTEN TRUE

## 2023-10-15 SDOH — HEALTH STABILITY: MENTAL HEALTH
STRESS IS WHEN SOMEONE FEELS TENSE, NERVOUS, ANXIOUS, OR CAN'T SLEEP AT NIGHT BECAUSE THEIR MIND IS TROUBLED. HOW STRESSED ARE YOU?: VERY MUCH

## 2023-10-15 SDOH — SOCIAL STABILITY: SOCIAL NETWORK
IN A TYPICAL WEEK, HOW MANY TIMES DO YOU TALK ON THE PHONE WITH FAMILY, FRIENDS, OR NEIGHBORS?: MORE THAN THREE TIMES A WEEK

## 2023-10-15 SDOH — ECONOMIC STABILITY: INCOME INSECURITY: HOW HARD IS IT FOR YOU TO PAY FOR THE VERY BASICS LIKE FOOD, HOUSING, MEDICAL CARE, AND HEATING?: NOT HARD AT ALL

## 2023-10-15 SDOH — HEALTH STABILITY: MENTAL HEALTH: HOW MANY STANDARD DRINKS CONTAINING ALCOHOL DO YOU HAVE ON A TYPICAL DAY?: 1 OR 2

## 2023-10-15 SDOH — HEALTH STABILITY: MENTAL HEALTH: HOW OFTEN DO YOU HAVE 6 OR MORE DRINKS ON ONE OCCASION?: NEVER

## 2023-10-15 SDOH — HEALTH STABILITY: PHYSICAL HEALTH: ON AVERAGE, HOW MANY DAYS PER WEEK DO YOU ENGAGE IN MODERATE TO STRENUOUS EXERCISE (LIKE A BRISK WALK)?: 7 DAYS

## 2023-10-15 SDOH — ECONOMIC STABILITY: FOOD INSECURITY: WITHIN THE PAST 12 MONTHS, YOU WORRIED THAT YOUR FOOD WOULD RUN OUT BEFORE YOU GOT MONEY TO BUY MORE.: SOMETIMES TRUE

## 2023-10-15 SDOH — ECONOMIC STABILITY: INCOME INSECURITY: IN THE PAST 12 MONTHS, HAS THE ELECTRIC, GAS, OIL, OR WATER COMPANY THREATENED TO SHUT OFF SERVICE IN YOUR HOME?: NO

## 2023-10-15 SDOH — ECONOMIC STABILITY: FOOD INSECURITY
WITHIN THE PAST 12 MONTHS, YOU WORRIED THAT YOUR FOOD WOULD RUN OUT BEFORE YOU GOT THE MONEY TO BUY MORE.: SOMETIMES TRUE

## 2023-10-15 SDOH — ECONOMIC STABILITY: FOOD INSECURITY: HOW HARD IS IT FOR YOU TO PAY FOR THE VERY BASICS LIKE FOOD, HOUSING, MEDICAL CARE, AND HEATING?: NOT HARD AT ALL

## 2023-10-15 SDOH — HEALTH STABILITY: MENTAL HEALTH: HAVE YOU WISHED YOU WERE DEAD OR WISHED YOU COULD GO TO SLEEP AND NOT WAKE UP?: NO

## 2023-10-15 SDOH — SOCIAL STABILITY: SOCIAL NETWORK: HOW OFTEN DO YOU ATTEND CHURCH OR RELIGIOUS SERVICES?: 1 TO 4 TIMES PER YEAR

## 2023-10-15 SDOH — HEALTH STABILITY: MENTAL HEALTH: HOW MANY DRINKS CONTAINING ALCOHOL DO YOU HAVE ON A TYPICAL DAY WHEN YOU ARE DRINKING?: 1 OR 2

## 2023-10-15 SDOH — ECONOMIC STABILITY: TRANSPORTATION INSECURITY
IN THE PAST 12 MONTHS, HAS LACK OF TRANSPORTATION KEPT YOU FROM MEETINGS, WORK, OR FROM GETTING THINGS NEEDED FOR DAILY LIVING?: NO

## 2023-10-15 SDOH — SOCIAL STABILITY: SOCIAL NETWORK: ARE YOU MARRIED, WIDOWED, DIVORCED, SEPARATED, NEVER MARRIED, OR LIVING WITH A PARTNER?: NEVER MARRIED

## 2023-10-15 SDOH — HEALTH STABILITY: MENTAL HEALTH: SUICIDE ASSESSMENT: ADULT (C-SSRS)

## 2023-10-15 SDOH — ECONOMIC STABILITY: INCOME INSECURITY: IN THE LAST 12 MONTHS, WAS THERE A TIME WHEN YOU WERE NOT ABLE TO PAY THE MORTGAGE OR RENT ON TIME?: NO

## 2023-10-15 SDOH — ECONOMIC STABILITY: HOUSING INSECURITY: IN THE LAST 12 MONTHS, WAS THERE A TIME WHEN YOU WERE NOT ABLE TO PAY THE MORTGAGE OR RENT ON TIME?: NO

## 2023-10-15 SDOH — HEALTH STABILITY: MENTAL HEALTH: HOW OFTEN DO YOU HAVE SIX OR MORE DRINKS ON ONE OCCASION?: NEVER

## 2023-10-15 SDOH — ECONOMIC STABILITY: TRANSPORTATION INSECURITY
IN THE PAST 12 MONTHS, HAS THE LACK OF TRANSPORTATION KEPT YOU FROM MEDICAL APPOINTMENTS OR FROM GETTING MEDICATIONS?: YES

## 2023-10-15 SDOH — ECONOMIC STABILITY: HOUSING INSECURITY: IN THE LAST 12 MONTHS, HOW MANY PLACES HAVE YOU LIVED?: 5

## 2023-10-15 SDOH — SOCIAL STABILITY: SOCIAL NETWORK
DO YOU BELONG TO ANY CLUBS OR ORGANIZATIONS SUCH AS CHURCH GROUPS UNIONS, FRATERNAL OR ATHLETIC GROUPS, OR SCHOOL GROUPS?: NO

## 2023-10-15 SDOH — HEALTH STABILITY: MENTAL HEALTH: HAVE YOU EVER DONE ANYTHING, STARTED TO DO ANYTHING, OR PREPARED TO DO ANYTHING TO END YOUR LIFE?: NO

## 2023-10-15 SDOH — ECONOMIC STABILITY: TRANSPORTATION INSECURITY: IN THE PAST 12 MONTHS, HAS LACK OF TRANSPORTATION KEPT YOU FROM MEDICAL APPOINTMENTS OR FROM GETTING MEDICATIONS?: YES

## 2023-10-15 ASSESSMENT — LIFESTYLE VARIABLES
AUDIT-C TOTAL SCORE: 2
REASON UNABLE TO ASSESS: NO
HAVE YOU EVER FELT YOU SHOULD CUT DOWN ON YOUR DRINKING: NO
HAVE PEOPLE ANNOYED YOU BY CRITICIZING YOUR DRINKING: NO
EVER FELT BAD OR GUILTY ABOUT YOUR DRINKING: NO
SKIP TO QUESTIONS 9-10: 1
EVER HAD A DRINK FIRST THING IN THE MORNING TO STEADY YOUR NERVES TO GET RID OF A HANGOVER: NO

## 2023-10-15 ASSESSMENT — PAIN SCALES - GENERAL
PAINLEVEL_OUTOF10: 7
PAINLEVEL_OUTOF10: 7
PAINLEVEL_OUTOF10: 0 - NO PAIN

## 2023-10-15 ASSESSMENT — ACTIVITIES OF DAILY LIVING (ADL): LACK_OF_TRANSPORTATION: NO
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
## 2023-10-15 NOTE — ED NOTES
Pharmacy Medication History Review    Manuel Palmer is a 48 y.o. female admitted for Acute on chronic congestive heart failure, unspecified heart failure type (CMS/[facility]). Pharmacy reviewed the patient's [id number] medications and allergies for accuracy.    The list below reflectives the updated PTA list. Please review each medication in order reconciliation for additional clarification and justification.  Prior to Admission Medications   Prescriptions Last Dose Informant Patient Reported? Taking?   ARIPiprazole (Abilify) 10 mg tablet Past Month Self, Other Yes Yes   Sig: Take 1 tablet (10 mg) by mouth once daily.   OXcarbazepine (Trileptal) 300 mg tablet Past Month Self, Other Yes Yes   Sig: Take 1 tablet (300 mg) by mouth once daily.   albuterol 90 mcg/actuation inhaler Past Month Self, Other Yes Yes   Sig: Inhale 2 puffs every 6 hours if needed for wheezing or shortness of breath.   atorvastatin (Lipitor) 40 mg tablet Past Month Self, Other Yes No   Sig: Take 1 tablet (40 mg) by mouth once daily. Bedtime   buPROPion XL (Wellbutrin XL) 150 mg 24 hr tablet Past Month Self, Other Yes Yes   Sig: Take 1 tablet (150 mg) by mouth once daily.   carvedilol (Coreg) 3.125 mg tablet Past Month Self, Other Yes Yes   Sig: Take 1 tablet (3.125 mg) by mouth 2 times a day with meals.   clopidogrel (Plavix) 75 mg tablet Past Month Self, Other Yes Yes   Sig: Take 1 tablet (75 mg) by mouth once daily.   docusate sodium (Colace) 100 mg capsule Past Month Self, Other Yes Yes   Sig: Take 1 capsule (100 mg) by mouth 2 times a day as needed for constipation.   enoxaparin (Lovenox) 120 mg/0.8 mL syringe  Other Yes No   Sig: Inject 0.7 mL (105 mg) under the skin every 12 hours. Expel 0.1 mLs and then inject 0.7 mLs under the skin every 12 hours or as directed by Coumadin clinic *Patient has NOT yet started, new dose (has not been seen at Coumadin Clinic since dose change on 10/01/2023)*   gabapentin (Neurontin) 300 mg capsule Past  Month Self, Other Yes Yes   Sig: Take 1 capsule (300 mg) by mouth every 12 hours.   hydrOXYzine HCL (Atarax) 25 mg tablet Past Month Self, Other Yes Yes   Sig: Take 1 tablet (25 mg) by mouth every 6 hours if needed.   melatonin 3 mg tablet Past Month Self, Other Yes No   Sig: Take 2 tablets (6 mg) by mouth once daily at bedtime. Bedtime *Patient unsure of strength/directions, reported taking 5 mg daily*   spironolactone (Aldactone) 25 mg tablet Past Month Self, Other Yes Yes   Sig: Take 1 tablet (25 mg) by mouth once daily.   torsemide (Demadex) 20 mg tablet Past Month Self, Other Yes Yes   Sig: Take 1 tablet (20 mg) by mouth once daily.   warfarin (Coumadin) 10 mg tablet  Other Yes Yes   Sig: Take 1.5 tablets (15 mg) by mouth. Or take as directed by Coumadin clinic *Patient has NOT yet started, new dose (has not been seen at Coumadin Clinic since dose change on 10/01/2023)*      Facility-Administered Medications: None         The list below reflectives the updated allergy list. Please review each documented allergy for additional clarification and justification.  Allergies  Reviewed by Jose Davis RN on 10/14/2023        Severity Reactions Comments    Aspirin High Anaphylaxis     Haldol [haloperidol] High Seizure         Sources: [facility]-Refill summary, anticoagulant telephone encounter 10/01/2023 (ROMINA, Cem Swain), [facility] Summary of Care (generated 10/14/2023), Medication Dispense History (SureScripts), OARRS (hydrocodone/acetaminophen  mg tablet- 3 day supply 03/24/2023, acetaminophen/codeine #3- 3 day supply 03/19/2023), and Patient Interview (jed historian, not feeling well during interview/trouble focusing)     Below are additional concerns with the patient's PTA list.  -All medication doses, strengths, and directions were obtained from [facility] Summary of Care (patient could confirm medication names, but had difficulty remembering directions/strengths of each home  medication)  -Lack of current fill history to confirm active home medications; most medications have not been filled since 09/20/2023 or later (likely non-compliant)  -History of being prescribed: baclofen 10 mg, duloxetine 60 mg, metformin  mg, and furosemide 40 mg. No recent fill history, except 10 day supply of baclofen 09/21/2023  -Per [facility] anticoagulant telephone/refill summary, 10/01/2023: patient discontinued on warfarin 3 mg, newly started on warfarin 10 mg and enoxaparin 120 mg/ 0.8 mL syringes. *Patient unable to confirm therapy directions, has not been to the anticoagulant/Coumadin clinic since new doses were initiated*  -Requires refills on several home medications, including: atorvastatin 40 mg (last filled 09/20/2023, 1 day supply), aripiprazole 10 mg (last filled 07/12/2023, 10 day supply), carvedilol 3.125 mg (last filled 09/20/2023, 15 day supply), spironolactone 25 mg (last filled 06/10/2023, 30 day supply), oxcarbazepine 300 mg (last filled 07/12/2023, 30 day supply), clopidogrel 75 mg (last filled 07/12/2023, 30 day supply), torsemide 20 mg (last filled 07/12/2023, 30 day supply), gabapentin 300 mg (last filled 09/20/2023, 1 day supply), hydroxyzine HCL 25 mg (last filled 09/20/2023, 7 day supply), warfarin 10 mg (new start, never filled), enoxaparin 120 mg/ 0.8 mL injection (new start, never filled)  -Last doses of daily maintenance medications were reported by patient as taken approximately 3 weeks ago    Dara Mcgee PharmD, Replaced by [facility] PGY1 Pharmacy Resident  Pleas reach out via Epic Chat for questions, or if no response call Socialware or bulletn.  [facility]
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
## 2023-10-15 NOTE — ED NOTES
Pt requesting food (specifically saltine crackers and turkey/ham sandwhich) pt on 2g low sodium diet. Pt educated on how she is unable to take these as it will not help to improve her swelling and fluid overload. Pt upset about this. Pt given leslie crackers instead and PBJ sandwhich.      Jorge L Diaz RN  10/15/23 [id number]

## 2023-10-16 ENCOUNTER — APPOINTMENT (OUTPATIENT)
Dept: CARDIOLOGY | Facility: HOSPITAL | Age: 48
End: 2023-10-16
Payer: COMMERCIAL

## 2023-10-16 ENCOUNTER — APPOINTMENT (OUTPATIENT)
Dept: VASCULAR MEDICINE | Facility: HOSPITAL | Age: 48
End: 2023-10-16
Payer: COMMERCIAL

## 2023-10-16 PROBLEM — Z86.718 HISTORY OF DVT (DEEP VEIN THROMBOSIS): Status: ACTIVE | Noted: 2023-10-16

## 2023-10-16 LAB
ALBUMIN SERPL BCP-MCNC: 3.5 G/DL (ref 3.4–5)
ALBUMIN SERPL BCP-MCNC: 3.5 G/DL (ref 3.4–5)
ALBUMIN SERPL BCP-MCNC: 3.8 G/DL (ref 3.4–5)
ANION GAP SERPL CALC-SCNC: 10 MMOL/L (ref 10–20)
ANION GAP SERPL CALC-SCNC: 11 MMOL/L (ref 10–20)
ANION GAP SERPL CALC-SCNC: 12 MMOL/L (ref 10–20)
ATRIAL RATE: 66 BPM
BASOPHILS # BLD AUTO: 0.02 X10*3/UL (ref 0–0.1)
BASOPHILS NFR BLD AUTO: 0.4 %
BUN SERPL-MCNC: 10 MG/DL (ref 6–23)
BUN SERPL-MCNC: 11 MG/DL (ref 6–23)
BUN SERPL-MCNC: 13 MG/DL (ref 6–23)
CALCIUM SERPL-MCNC: 8.9 MG/DL (ref 8.6–10.6)
CALCIUM SERPL-MCNC: 8.9 MG/DL (ref 8.6–10.6)
CALCIUM SERPL-MCNC: 9.3 MG/DL (ref 8.6–10.6)
CHLORIDE SERPL-SCNC: 102 MMOL/L (ref 98–107)
CHLORIDE SERPL-SCNC: 104 MMOL/L (ref 98–107)
CHLORIDE SERPL-SCNC: 105 MMOL/L (ref 98–107)
CO2 SERPL-SCNC: 29 MMOL/L (ref 21–32)
CO2 SERPL-SCNC: 30 MMOL/L (ref 21–32)
CO2 SERPL-SCNC: 32 MMOL/L (ref 21–32)
CREAT SERPL-MCNC: 0.81 MG/DL (ref 0.5–1.05)
CREAT SERPL-MCNC: 0.88 MG/DL (ref 0.5–1.05)
CREAT SERPL-MCNC: 0.92 MG/DL (ref 0.5–1.05)
EJECTION FRACTION APICAL 4 CHAMBER: 44.7
EOSINOPHIL # BLD AUTO: 0.12 X10*3/UL (ref 0–0.7)
EOSINOPHIL NFR BLD AUTO: 2.7 %
ERYTHROCYTE [DISTWIDTH] IN BLOOD BY AUTOMATED COUNT: 13.8 % (ref 11.5–14.5)
GFR SERPL CREATININE-BSD FRML MDRD: 77 ML/MIN/1.73M*2
GFR SERPL CREATININE-BSD FRML MDRD: 81 ML/MIN/1.73M*2
GFR SERPL CREATININE-BSD FRML MDRD: 90 ML/MIN/1.73M*2
GLUCOSE SERPL-MCNC: 104 MG/DL (ref 74–99)
GLUCOSE SERPL-MCNC: 122 MG/DL (ref 74–99)
GLUCOSE SERPL-MCNC: 86 MG/DL (ref 74–99)
HCT VFR BLD AUTO: 37 % (ref 36–46)
HGB BLD-MCNC: 12.1 G/DL (ref 12–16)
IMM GRANULOCYTES # BLD AUTO: 0.01 X10*3/UL (ref 0–0.7)
IMM GRANULOCYTES NFR BLD AUTO: 0.2 % (ref 0–0.9)
LMWH PPP CHRO-ACNC: 0.2 IU/ML
LYMPHOCYTES # BLD AUTO: 0.76 X10*3/UL (ref 1.2–4.8)
LYMPHOCYTES NFR BLD AUTO: 17 %
MAGNESIUM SERPL-MCNC: 1.69 MG/DL (ref 1.6–2.4)
MCH RBC QN AUTO: 30 PG (ref 26–34)
MCHC RBC AUTO-ENTMCNC: 32.7 G/DL (ref 32–36)
MCV RBC AUTO: 92 FL (ref 80–100)
MONOCYTES # BLD AUTO: 0.25 X10*3/UL (ref 0.1–1)
MONOCYTES NFR BLD AUTO: 5.6 %
NEUTROPHILS # BLD AUTO: 3.3 X10*3/UL (ref 1.2–7.7)
NEUTROPHILS NFR BLD AUTO: 74.1 %
NRBC BLD-RTO: 0 /100 WBCS (ref 0–0)
P AXIS: 42 DEGREES
P OFFSET: 192 MS
P ONSET: 136 MS
PHOSPHATE SERPL-MCNC: 3.3 MG/DL (ref 2.5–4.9)
PHOSPHATE SERPL-MCNC: 3.4 MG/DL (ref 2.5–4.9)
PHOSPHATE SERPL-MCNC: 3.5 MG/DL (ref 2.5–4.9)
PLATELET # BLD AUTO: 217 X10*3/UL (ref 150–450)
PMV BLD AUTO: 8.9 FL (ref 7.5–11.5)
POTASSIUM SERPL-SCNC: 3.2 MMOL/L (ref 3.5–5.3)
POTASSIUM SERPL-SCNC: 3.8 MMOL/L (ref 3.5–5.3)
POTASSIUM SERPL-SCNC: 3.9 MMOL/L (ref 3.5–5.3)
PR INTERVAL: 166 MS
Q ONSET: 219 MS
QRS COUNT: 11 BEATS
QRS DURATION: 88 MS
QT INTERVAL: 416 MS
QTC CALCULATION(BAZETT): 436 MS
QTC FREDERICIA: 429 MS
R AXIS: -10 DEGREES
RBC # BLD AUTO: 4.04 X10*6/UL (ref 4–5.2)
SODIUM SERPL-SCNC: 141 MMOL/L (ref 136–145)
SODIUM SERPL-SCNC: 141 MMOL/L (ref 136–145)
SODIUM SERPL-SCNC: 142 MMOL/L (ref 136–145)
T AXIS: 28 DEGREES
T OFFSET: 427 MS
VENTRICULAR RATE: 66 BPM
WBC # BLD AUTO: 4.5 X10*3/UL (ref 4.4–11.3)

## 2023-10-16 PROCEDURE — 2500000004 HC RX 250 GENERAL PHARMACY W/ HCPCS (ALT 636 FOR OP/ED)

## 2023-10-16 PROCEDURE — 80069 RENAL FUNCTION PANEL: CPT

## 2023-10-16 PROCEDURE — 36415 COLL VENOUS BLD VENIPUNCTURE: CPT

## 2023-10-16 PROCEDURE — 1210000001 HC SEMI-PRIVATE ROOM DAILY

## 2023-10-16 PROCEDURE — 51702 INSERT TEMP BLADDER CATH: CPT

## 2023-10-16 PROCEDURE — 83735 ASSAY OF MAGNESIUM: CPT

## 2023-10-16 PROCEDURE — 85520 HEPARIN ASSAY: CPT | Mod: CMCLAB

## 2023-10-16 PROCEDURE — 36415 COLL VENOUS BLD VENIPUNCTURE: CPT | Mod: CMCLAB

## 2023-10-16 PROCEDURE — 85025 COMPLETE CBC W/AUTO DIFF WBC: CPT

## 2023-10-16 PROCEDURE — 2500000005 HC RX 250 GENERAL PHARMACY W/O HCPCS

## 2023-10-16 PROCEDURE — 93005 ELECTROCARDIOGRAM TRACING: CPT

## 2023-10-16 PROCEDURE — 93306 TTE W/DOPPLER COMPLETE: CPT

## 2023-10-16 PROCEDURE — 99232 SBSQ HOSP IP/OBS MODERATE 35: CPT | Performed by: INTERNAL MEDICINE

## 2023-10-16 PROCEDURE — 2500000001 HC RX 250 WO HCPCS SELF ADMINISTERED DRUGS (ALT 637 FOR MEDICARE OP)

## 2023-10-16 PROCEDURE — 93306 TTE W/DOPPLER COMPLETE: CPT | Performed by: STUDENT IN AN ORGANIZED HEALTH CARE EDUCATION/TRAINING PROGRAM

## 2023-10-16 PROCEDURE — 93970 EXTREMITY STUDY: CPT | Performed by: SURGERY

## 2023-10-16 RX ORDER — LIDOCAINE 560 MG/1
1 PATCH PERCUTANEOUS; TOPICAL; TRANSDERMAL DAILY
Qty: 30 PATCH | Refills: 1 | Status: CANCELLED
Start: 2023-10-17

## 2023-10-16 RX ORDER — OXYCODONE HYDROCHLORIDE 5 MG/1
5 TABLET ORAL ONCE
Status: COMPLETED | OUTPATIENT
Start: 2023-10-16 | End: 2023-10-16

## 2023-10-16 RX ORDER — MAGNESIUM SULFATE HEPTAHYDRATE 40 MG/ML
2 INJECTION, SOLUTION INTRAVENOUS ONCE
Status: COMPLETED | OUTPATIENT
Start: 2023-10-16 | End: 2023-10-16

## 2023-10-16 RX ORDER — AMOXICILLIN 250 MG
1 CAPSULE ORAL NIGHTLY
Qty: 60 TABLET | Refills: 1 | Status: CANCELLED
Start: 2023-10-16

## 2023-10-16 RX ORDER — AMOXICILLIN 250 MG
1 CAPSULE ORAL NIGHTLY
Status: DISCONTINUED | OUTPATIENT
Start: 2023-10-16 | End: 2023-10-20 | Stop reason: HOSPADM

## 2023-10-16 RX ORDER — ENOXAPARIN SODIUM 150 MG/ML
1 INJECTION SUBCUTANEOUS EVERY 12 HOURS
Qty: 90 EACH | Refills: 2 | Status: CANCELLED
Start: 2023-10-17

## 2023-10-16 RX ORDER — ACETAMINOPHEN 325 MG/1
975 TABLET ORAL EVERY 8 HOURS PRN
Qty: 30 TABLET | Refills: 0 | Status: CANCELLED | OUTPATIENT
Start: 2023-10-16

## 2023-10-16 RX ORDER — POTASSIUM CHLORIDE 14.9 MG/ML
20 INJECTION INTRAVENOUS ONCE
Status: COMPLETED | OUTPATIENT
Start: 2023-10-16 | End: 2023-10-16

## 2023-10-16 RX ORDER — OXYCODONE HYDROCHLORIDE 5 MG/1
2.5 TABLET ORAL ONCE
Status: COMPLETED | OUTPATIENT
Start: 2023-10-16 | End: 2023-10-16

## 2023-10-16 RX ORDER — OXCARBAZEPINE 300 MG/1
300 TABLET, FILM COATED ORAL NIGHTLY
Qty: 30 TABLET | Refills: 2 | Status: CANCELLED
Start: 2023-10-17

## 2023-10-16 RX ORDER — LIDOCAINE 560 MG/1
1 PATCH PERCUTANEOUS; TOPICAL; TRANSDERMAL DAILY
Status: DISCONTINUED | OUTPATIENT
Start: 2023-10-16 | End: 2023-10-20 | Stop reason: HOSPADM

## 2023-10-16 RX ORDER — ATORVASTATIN CALCIUM 80 MG/1
80 TABLET, FILM COATED ORAL DAILY
Status: DISCONTINUED | OUTPATIENT
Start: 2023-10-17 | End: 2023-10-20 | Stop reason: HOSPADM

## 2023-10-16 RX ORDER — POTASSIUM CHLORIDE 1.5 G/1.58G
40 POWDER, FOR SOLUTION ORAL ONCE
Status: COMPLETED | OUTPATIENT
Start: 2023-10-16 | End: 2023-10-16

## 2023-10-16 RX ADMIN — FUROSEMIDE 40 MG: 10 INJECTION, SOLUTION INTRAVENOUS at 11:51

## 2023-10-16 RX ADMIN — ENOXAPARIN SODIUM 120 MG: 150 INJECTION SUBCUTANEOUS at 20:30

## 2023-10-16 RX ADMIN — CLOPIDOGREL BISULFATE 75 MG: 75 TABLET ORAL at 11:50

## 2023-10-16 RX ADMIN — ACETAMINOPHEN 975 MG: 325 TABLET ORAL at 01:40

## 2023-10-16 RX ADMIN — POTASSIUM CHLORIDE 20 MEQ: 14.9 INJECTION, SOLUTION INTRAVENOUS at 17:39

## 2023-10-16 RX ADMIN — ATORVASTATIN CALCIUM 40 MG: 20 TABLET, FILM COATED ORAL at 11:50

## 2023-10-16 RX ADMIN — LIDOCAINE 1 PATCH: 4 PATCH TOPICAL at 04:07

## 2023-10-16 RX ADMIN — SENNOSIDES AND DOCUSATE SODIUM 1 TABLET: 8.6; 5 TABLET ORAL at 20:58

## 2023-10-16 RX ADMIN — LIDOCAINE 1 PATCH: 4 PATCH TOPICAL at 11:52

## 2023-10-16 RX ADMIN — WARFARIN SODIUM 10 MG: 10 TABLET ORAL at 17:38

## 2023-10-16 RX ADMIN — OXYCODONE HYDROCHLORIDE 2.5 MG: 5 TABLET ORAL at 04:34

## 2023-10-16 RX ADMIN — PERFLUTREN 2 ML OF DILUTION: 6.52 INJECTION, SUSPENSION INTRAVENOUS at 10:22

## 2023-10-16 RX ADMIN — BUPROPION HYDROCHLORIDE 150 MG: 150 TABLET, EXTENDED RELEASE ORAL at 12:22

## 2023-10-16 RX ADMIN — ARIPIPRAZOLE 10 MG: 10 TABLET ORAL at 11:50

## 2023-10-16 RX ADMIN — POTASSIUM CHLORIDE 40 MEQ: 1.5 POWDER, FOR SOLUTION ORAL at 04:07

## 2023-10-16 RX ADMIN — GABAPENTIN 300 MG: 300 CAPSULE ORAL at 11:54

## 2023-10-16 RX ADMIN — GABAPENTIN 300 MG: 300 CAPSULE ORAL at 22:05

## 2023-10-16 RX ADMIN — MELATONIN 6 MG: 3 TAB ORAL at 20:29

## 2023-10-16 RX ADMIN — OXYCODONE HYDROCHLORIDE 5 MG: 5 TABLET ORAL at 18:57

## 2023-10-16 RX ADMIN — ENOXAPARIN SODIUM 120 MG: 150 INJECTION SUBCUTANEOUS at 11:49

## 2023-10-16 RX ADMIN — SPIRONOLACTONE 25 MG: 25 TABLET ORAL at 11:50

## 2023-10-16 RX ADMIN — OXCARBAZEPINE 300 MG: 300 TABLET, FILM COATED ORAL at 20:29

## 2023-10-16 RX ADMIN — MAGNESIUM SULFATE HEPTAHYDRATE 2 G: 40 INJECTION, SOLUTION INTRAVENOUS at 04:07

## 2023-10-16 RX ADMIN — FUROSEMIDE 40 MG: 10 INJECTION, SOLUTION INTRAVENOUS at 21:50

## 2023-10-16 ASSESSMENT — PAIN SCALES - GENERAL
PAINLEVEL_OUTOF10: 0 - NO PAIN
PAINLEVEL_OUTOF10: 0 - NO PAIN
PAINLEVEL_OUTOF10: 6
PAINLEVEL_OUTOF10: 0 - NO PAIN
PAINLEVEL_OUTOF10: 7
PAINLEVEL_OUTOF10: 8

## 2023-10-16 NOTE — PROGRESS NOTES
Update from Admitting team. Patient is likely not going to be ready for discharge until tomorrow. The [facility] (SNF who accepted patient) made aware and will continue to follow.    VAIBHAV ANDRADE

## 2023-10-16 NOTE — HOSPITAL COURSE
Manuel Palmer is a 48-year-old female with the PMH of CHF (on Torsemide, Carvedilol, Spironolactone), previous TIA/NSTEMI, DVTs/PE (on Warfarin, Lovenox and Plavix), cocaine abuse, bipolar disease (on Aripiprazole & Oxcarbazepine), HTN,, DLD (on Atrovastatin), ?COPD, presenting to ED on 10/14 with shortness of breath and bilateral leg swelling and pain for 4 days. At ED, D-dimer was 1135. CXR was negative for pulmonary edema. CT PE showed no evidence of PE or PNA. Considering Patient's not taking medications for 3 weeks, the symptoms are most likely caused by acute exacerbation of CHF without medication control. TTE on 10/16 showed 50% EF and moderate AR. DVT US on 10/16 was negative for acute DVT in BLE. Symptomatic bacteruria and suspected UTI was noted on 10/20. Plan to give 5 days of PO Bactrim BID after discharge, continue diuresing w/ PO Lasix, c/w home Carvedilol and PO Coumadin 7.5 mg daily.     Patient is under Tramadol 50 mg q6h, Gabapentin 400 TID, Tylenol 975 mg TID, and Lidocaine patch for pain control at discharge.     Things to follow up after discharge:  - complete antibiotics (Bactrim) treatment course for 5 days in total (10/20-10/24)  - should obtain daily INR levels at SNF. To discontinue lovenox once INR is therapeutic (goal 2-3) and titrate warfarin accordingly. Is being discharged on 7.5mg nightly, requires close monitoring at SNF

## 2023-10-16 NOTE — PROGRESS NOTES
Picked patient up on sign out from  from yesterday. Referral to the [facility] and appropriate documents sent. They are willing to accept patient once she is medically ready for discharge. Admitting team asked if they are able to bridge from Lovenox to Warfarin and a therapeutic INR. Facility has stated that yes they can bridge. Admitting team aware. Await PT eval. Will continue to follow.    VAIBHAV ANDRADE
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
## 2023-10-17 LAB
APTT PPP: 38 SECONDS (ref 27–38)
BASOPHILS # BLD AUTO: 0.02 X10*3/UL (ref 0–0.1)
BASOPHILS NFR BLD AUTO: 0.5 %
EOSINOPHIL # BLD AUTO: 0.21 X10*3/UL (ref 0–0.7)
EOSINOPHIL NFR BLD AUTO: 4.9 %
ERYTHROCYTE [DISTWIDTH] IN BLOOD BY AUTOMATED COUNT: 13.9 % (ref 11.5–14.5)
HCT VFR BLD AUTO: 39 % (ref 36–46)
HGB BLD-MCNC: 12.1 G/DL (ref 12–16)
IMM GRANULOCYTES # BLD AUTO: 0.01 X10*3/UL (ref 0–0.7)
IMM GRANULOCYTES NFR BLD AUTO: 0.2 % (ref 0–0.9)
INR PPP: 1 (ref 0.9–1.1)
LMWH PPP CHRO-ACNC: 0.9 IU/ML
LYMPHOCYTES # BLD AUTO: 1.31 X10*3/UL (ref 1.2–4.8)
LYMPHOCYTES NFR BLD AUTO: 30.7 %
MAGNESIUM SERPL-MCNC: 2.05 MG/DL (ref 1.6–2.4)
MCH RBC QN AUTO: 29.7 PG (ref 26–34)
MCHC RBC AUTO-ENTMCNC: 31 G/DL (ref 32–36)
MCV RBC AUTO: 96 FL (ref 80–100)
MONOCYTES # BLD AUTO: 0.41 X10*3/UL (ref 0.1–1)
MONOCYTES NFR BLD AUTO: 9.6 %
NEUTROPHILS # BLD AUTO: 2.31 X10*3/UL (ref 1.2–7.7)
NEUTROPHILS NFR BLD AUTO: 54.1 %
NRBC BLD-RTO: 0 /100 WBCS (ref 0–0)
PLATELET # BLD AUTO: 233 X10*3/UL (ref 150–450)
PMV BLD AUTO: 8.8 FL (ref 7.5–11.5)
PROTHROMBIN TIME: 11.2 SECONDS (ref 9.8–12.8)
RBC # BLD AUTO: 4.08 X10*6/UL (ref 4–5.2)
WBC # BLD AUTO: 4.3 X10*3/UL (ref 4.4–11.3)

## 2023-10-17 PROCEDURE — 97161 PT EVAL LOW COMPLEX 20 MIN: CPT | Mod: GP | Performed by: PHYSICAL THERAPIST

## 2023-10-17 PROCEDURE — 99232 SBSQ HOSP IP/OBS MODERATE 35: CPT | Performed by: INTERNAL MEDICINE

## 2023-10-17 PROCEDURE — 85610 PROTHROMBIN TIME: CPT

## 2023-10-17 PROCEDURE — 85520 HEPARIN ASSAY: CPT

## 2023-10-17 PROCEDURE — 85025 COMPLETE CBC W/AUTO DIFF WBC: CPT

## 2023-10-17 PROCEDURE — 36415 COLL VENOUS BLD VENIPUNCTURE: CPT

## 2023-10-17 PROCEDURE — 2500000001 HC RX 250 WO HCPCS SELF ADMINISTERED DRUGS (ALT 637 FOR MEDICARE OP)

## 2023-10-17 PROCEDURE — 97165 OT EVAL LOW COMPLEX 30 MIN: CPT | Mod: GO

## 2023-10-17 PROCEDURE — 96372 THER/PROPH/DIAG INJ SC/IM: CPT

## 2023-10-17 PROCEDURE — 83735 ASSAY OF MAGNESIUM: CPT

## 2023-10-17 PROCEDURE — 2500000005 HC RX 250 GENERAL PHARMACY W/O HCPCS

## 2023-10-17 PROCEDURE — 97530 THERAPEUTIC ACTIVITIES: CPT | Mod: GP | Performed by: PHYSICAL THERAPIST

## 2023-10-17 PROCEDURE — 97530 THERAPEUTIC ACTIVITIES: CPT | Mod: GO

## 2023-10-17 PROCEDURE — 36415 COLL VENOUS BLD VENIPUNCTURE: CPT | Mod: CMCLAB

## 2023-10-17 PROCEDURE — 2500000004 HC RX 250 GENERAL PHARMACY W/ HCPCS (ALT 636 FOR OP/ED)

## 2023-10-17 PROCEDURE — 1100000001 HC PRIVATE ROOM DAILY

## 2023-10-17 RX ORDER — ACETAMINOPHEN 325 MG/1
650 TABLET ORAL ONCE
Status: DISCONTINUED | OUTPATIENT
Start: 2023-10-17 | End: 2023-10-20 | Stop reason: HOSPADM

## 2023-10-17 RX ORDER — OXYCODONE HYDROCHLORIDE 5 MG/1
2.5 TABLET ORAL ONCE
Status: COMPLETED | OUTPATIENT
Start: 2023-10-17 | End: 2023-10-17

## 2023-10-17 RX ORDER — GABAPENTIN 300 MG/1
300 CAPSULE ORAL EVERY 8 HOURS PRN
Status: DISCONTINUED | OUTPATIENT
Start: 2023-10-17 | End: 2023-10-19

## 2023-10-17 RX ORDER — FUROSEMIDE 40 MG/1
40 TABLET ORAL DAILY
Status: DISCONTINUED | OUTPATIENT
Start: 2023-10-18 | End: 2023-10-20 | Stop reason: HOSPADM

## 2023-10-17 RX ADMIN — OXYCODONE HYDROCHLORIDE 2.5 MG: 5 TABLET ORAL at 04:31

## 2023-10-17 RX ADMIN — FUROSEMIDE 40 MG: 10 INJECTION, SOLUTION INTRAVENOUS at 11:29

## 2023-10-17 RX ADMIN — FUROSEMIDE 40 MG: 10 INJECTION, SOLUTION INTRAVENOUS at 21:33

## 2023-10-17 RX ADMIN — GABAPENTIN 300 MG: 300 CAPSULE ORAL at 11:29

## 2023-10-17 RX ADMIN — WARFARIN SODIUM 10 MG: 10 TABLET ORAL at 18:21

## 2023-10-17 RX ADMIN — ARIPIPRAZOLE 10 MG: 10 TABLET ORAL at 08:52

## 2023-10-17 RX ADMIN — CARVEDILOL 3.12 MG: 3.12 TABLET, FILM COATED ORAL at 18:21

## 2023-10-17 RX ADMIN — ENOXAPARIN SODIUM 120 MG: 150 INJECTION SUBCUTANEOUS at 11:29

## 2023-10-17 RX ADMIN — GABAPENTIN 300 MG: 300 CAPSULE ORAL at 21:32

## 2023-10-17 RX ADMIN — CLOPIDOGREL BISULFATE 75 MG: 75 TABLET ORAL at 08:52

## 2023-10-17 RX ADMIN — ATORVASTATIN CALCIUM 80 MG: 80 TABLET, FILM COATED ORAL at 21:32

## 2023-10-17 RX ADMIN — MELATONIN 6 MG: 3 TAB ORAL at 21:32

## 2023-10-17 RX ADMIN — BUPROPION HYDROCHLORIDE 150 MG: 150 TABLET, EXTENDED RELEASE ORAL at 11:29

## 2023-10-17 RX ADMIN — ENOXAPARIN SODIUM 120 MG: 150 INJECTION SUBCUTANEOUS at 21:32

## 2023-10-17 RX ADMIN — SENNOSIDES AND DOCUSATE SODIUM 1 TABLET: 8.6; 5 TABLET ORAL at 21:32

## 2023-10-17 RX ADMIN — LIDOCAINE 1 PATCH: 4 PATCH TOPICAL at 08:52

## 2023-10-17 RX ADMIN — OXCARBAZEPINE 300 MG: 300 TABLET, FILM COATED ORAL at 21:32

## 2023-10-17 RX ADMIN — ACETAMINOPHEN 975 MG: 325 TABLET ORAL at 03:18

## 2023-10-17 RX ADMIN — ACETAMINOPHEN 975 MG: 325 TABLET ORAL at 18:26

## 2023-10-17 RX ADMIN — SPIRONOLACTONE 25 MG: 25 TABLET ORAL at 08:52

## 2023-10-17 SDOH — SOCIAL STABILITY: SOCIAL INSECURITY: ABUSE: ADULT

## 2023-10-17 SDOH — SOCIAL STABILITY: SOCIAL INSECURITY: DO YOU FEEL ANYONE HAS EXPLOITED OR TAKEN ADVANTAGE OF YOU FINANCIALLY OR OF YOUR PERSONAL PROPERTY?: YES

## 2023-10-17 SDOH — SOCIAL STABILITY: SOCIAL INSECURITY: ARE YOU OR HAVE YOU BEEN THREATENED OR ABUSED PHYSICALLY, EMOTIONALLY, OR SEXUALLY BY ANYONE?: YES

## 2023-10-17 SDOH — SOCIAL STABILITY: SOCIAL INSECURITY: DOES ANYONE TRY TO KEEP YOU FROM HAVING/CONTACTING OTHER FRIENDS OR DOING THINGS OUTSIDE YOUR HOME?: YES

## 2023-10-17 SDOH — SOCIAL STABILITY: SOCIAL INSECURITY: HAS ANYONE EVER THREATENED TO HURT YOUR FAMILY OR YOUR PETS?: YES

## 2023-10-17 SDOH — HEALTH STABILITY: MENTAL HEALTH: EXPERIENCED ANY OF THE FOLLOWING LIFE EVENTS: PHYSICAL ASSAULT

## 2023-10-17 SDOH — SOCIAL STABILITY: SOCIAL INSECURITY: DO YOU FEEL UNSAFE GOING BACK TO THE PLACE WHERE YOU ARE LIVING?: YES

## 2023-10-17 SDOH — SOCIAL STABILITY: SOCIAL INSECURITY: HAVE YOU HAD THOUGHTS OF HARMING ANYONE ELSE?: NO

## 2023-10-17 SDOH — SOCIAL STABILITY: SOCIAL INSECURITY: ARE THERE ANY APPARENT SIGNS OF INJURIES/BEHAVIORS THAT COULD BE RELATED TO ABUSE/NEGLECT?: YES

## 2023-10-17 ASSESSMENT — PAIN DESCRIPTION - DESCRIPTORS: DESCRIPTORS: ACHING

## 2023-10-17 ASSESSMENT — COGNITIVE AND FUNCTIONAL STATUS - GENERAL
MOVING TO AND FROM BED TO CHAIR: TOTAL
MOVING TO AND FROM BED TO CHAIR: TOTAL
TURNING FROM BACK TO SIDE WHILE IN FLAT BAD: A LITTLE
HELP NEEDED FOR BATHING: A LITTLE
STANDING UP FROM CHAIR USING ARMS: TOTAL
DAILY ACTIVITIY SCORE: 21
MOVING TO AND FROM BED TO CHAIR: TOTAL
TOILETING: A LOT
CLIMB 3 TO 5 STEPS WITH RAILING: TOTAL
MOBILITY SCORE: 10
MOVING FROM LYING ON BACK TO SITTING ON SIDE OF FLAT BED WITH BEDRAILS: A LITTLE
TOILETING: A LITTLE
TURNING FROM BACK TO SIDE WHILE IN FLAT BAD: A LITTLE
PERSONAL GROOMING: A LITTLE
MOBILITY SCORE: 10
DRESSING REGULAR LOWER BODY CLOTHING: A LITTLE
CLIMB 3 TO 5 STEPS WITH RAILING: TOTAL
DAILY ACTIVITIY SCORE: 16
MOVING FROM LYING ON BACK TO SITTING ON SIDE OF FLAT BED WITH BEDRAILS: A LITTLE
TURNING FROM BACK TO SIDE WHILE IN FLAT BAD: A LITTLE
WALKING IN HOSPITAL ROOM: TOTAL
TOILETING: A LITTLE
HELP NEEDED FOR BATHING: A LITTLE
WALKING IN HOSPITAL ROOM: TOTAL
STANDING UP FROM CHAIR USING ARMS: TOTAL
WALKING IN HOSPITAL ROOM: TOTAL
PATIENT BASELINE BEDBOUND: NO
TURNING FROM BACK TO SIDE WHILE IN FLAT BAD: A LITTLE
CLIMB 3 TO 5 STEPS WITH RAILING: TOTAL
MOBILITY SCORE: 10
MOVING FROM LYING ON BACK TO SITTING ON SIDE OF FLAT BED WITH BEDRAILS: A LITTLE
STANDING UP FROM CHAIR USING ARMS: TOTAL
HELP NEEDED FOR BATHING: A LOT
MOVING FROM LYING ON BACK TO SITTING ON SIDE OF FLAT BED WITH BEDRAILS: A LITTLE
MOBILITY SCORE: 10
CLIMB 3 TO 5 STEPS WITH RAILING: TOTAL
DRESSING REGULAR LOWER BODY CLOTHING: A LOT
WALKING IN HOSPITAL ROOM: TOTAL
DRESSING REGULAR LOWER BODY CLOTHING: A LITTLE
MOVING TO AND FROM BED TO CHAIR: TOTAL
DAILY ACTIVITIY SCORE: 21
STANDING UP FROM CHAIR USING ARMS: TOTAL
DRESSING REGULAR UPPER BODY CLOTHING: A LITTLE

## 2023-10-17 ASSESSMENT — LIFESTYLE VARIABLES
HOW MANY STANDARD DRINKS CONTAINING ALCOHOL DO YOU HAVE ON A TYPICAL DAY: 1 OR 2
AUDIT-C TOTAL SCORE: 1
AUDIT-C TOTAL SCORE: 1
SKIP TO QUESTIONS 9-10: 1
HOW OFTEN DO YOU HAVE A DRINK CONTAINING ALCOHOL: MONTHLY OR LESS
HOW OFTEN DO YOU HAVE 6 OR MORE DRINKS ON ONE OCCASION: NEVER
SUBSTANCE_ABUSE_PAST_12_MONTHS: YES
PRESCIPTION_ABUSE_PAST_12_MONTHS: NO

## 2023-10-17 ASSESSMENT — PAIN SCALES - GENERAL
PAINLEVEL_OUTOF10: 10 - WORST POSSIBLE PAIN
PAINLEVEL_OUTOF10: 0 - NO PAIN
PAINLEVEL_OUTOF10: 10 - WORST POSSIBLE PAIN

## 2023-10-17 ASSESSMENT — ACTIVITIES OF DAILY LIVING (ADL)
ADEQUATE_TO_COMPLETE_ADL: YES
HEARING - LEFT EAR: FUNCTIONAL
BATHING_ASSISTANCE: MAXIMAL
HEARING - RIGHT EAR: FUNCTIONAL
JUDGMENT_ADEQUATE_SAFELY_COMPLETE_DAILY_ACTIVITIES: YES
GROOMING: INDEPENDENT
BATHING: NEEDS ASSISTANCE
DRESSING YOURSELF: NEEDS ASSISTANCE
WALKS IN HOME: DEPENDENT
TOILETING: INDEPENDENT
PATIENT'S MEMORY ADEQUATE TO SAFELY COMPLETE DAILY ACTIVITIES?: YES
ASSISTIVE_DEVICE: WHEELCHAIR;WALKER;CANE
LACK_OF_TRANSPORTATION: YES
FEEDING YOURSELF: INDEPENDENT

## 2023-10-17 ASSESSMENT — PAIN - FUNCTIONAL ASSESSMENT
PAIN_FUNCTIONAL_ASSESSMENT: 0-10

## 2023-10-17 ASSESSMENT — PATIENT HEALTH QUESTIONNAIRE - PHQ9
1. LITTLE INTEREST OR PLEASURE IN DOING THINGS: NEARLY EVERY DAY
SUM OF ALL RESPONSES TO PHQ9 QUESTIONS 1 & 2: 4
2. FEELING DOWN, DEPRESSED OR HOPELESS: SEVERAL DAYS
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
## 2023-10-17 NOTE — CONSULTS
"Nutrition Education:   Reason for Assessment:  (Obesity - healthy diet education)    Patient is a 48 y.o. female presenting with: Acute on chronic congestive heart failure, unspecified heart failure type (CMS/HCC).    Nutrition Education:  Food and Nutrient History: Met with pt to go over heart heathy diet. Reports she has a great appetite. Denies loss of appetite. Appears physically well-nourished. Pt reports she has had multiple prior educations on heart healthy diet. Pt was receptive to reviewing diet recommendations. Educated pt on low salt and fat diet. Went over recommended and non-recommend foods. Recommended pt consume low fat meats such as fish, chicken, turkey. Encouraged pt to limit high fat meats such as beef and pork. Reports she uses a lot of butter to cook with most meals. Recommended pt swtich to cooking with olive oil to cut back on saturated fats. Reports she drinks whole milk. Educated pt that whole milk is high in fat and recommended pt switch to 2%, 1%, or fat free milk. Talked about high fat and salt content of processed/pre packaged foods and to limit consumption. Provided pt with handout from Nutrition Care Manual: Heart Healthy Nutrition Therapy. Pt asked about what foods have high potassium because she is trying to limit potassium intake. Educated that cooked spinach, raw spinach, kale, tomatoes, potatoes, bananas, orange juice are high in potassium.  Food Allergies/Intolerances:  None  Energy intake: Energy Intake: Good > 75 %    Anthropometrics:  Height: 175.3 cm (5' 9.02\")  Weight: 122 kg (270 lb)  BMI (Calculated): 39.85  IBW/kg (Dietitian Calculated): 65.9 kg  Percent of IBW: 185.8 %       Nutrition Specific Mediations:  Current Facility-Administered Medications:     furosemide (Lasix) injection 40 mg, 40 mg, intravenous, q12h, Aron Huang MD, 40 mg at 10/17/23 1123    spironolactone (Aldactone) tablet 25 mg, 25 mg, oral, Daily, Aron Huang MD, 25 mg at 10/17/23 2962    [Held " by provider] torsemide (Demadex) tablet 20 mg, 20 mg, oral, Daily, Aron Huang MD    warfarin (Coumadin) tablet 10 mg, 10 mg, oral, Daily, Aron Huang MD, 10 mg at 10/16/23 1738       Dietary Orders (From admission, onward)       Start     Ordered    10/17/23 1129  Adult diet Regular; 2000 mL fluid  Diet effective now        Question Answer Comment   Diet type Regular    Dietary fluid restriction / 24h: 2000 mL fluid        10/17/23 1128                  Time Spent/Follow-up Reminder:   Follow Up  Last Date of Nutrition Visit: 10/17/23  Nutrition Follow-Up Needed?: Dietitian to reassess per policy

## 2023-10-17 NOTE — PROGRESS NOTES
Occupational Therapy    Evaluation/Treatment    Patient Name: Manuel Palmer  MRN: [id number]  : 1975  Today's Date: 10/17/23  Time Calculation  Start Time:   Stop Time: 1527  Time Calculation (min): 30 min     Assessment:  Prognosis: Good  Evaluation/Treatment Tolerance: Patient limited by pain  End of Session Communication: Bedside nurse  End of Session Patient Position: Bed, 3 rail up, Alarm off, not on at start of session  OT Assessment Results: Decreased ADL status, Decreased upper extremity range of motion, Decreased endurance, Decreased functional mobility  Prognosis: Good  Evaluation/Treatment Tolerance: Patient limited by pain  Plan:  Treatment Interventions: ADL retraining, Functional transfer training, UE strengthening/ROM, Endurance training, Compensatory technique education  OT Frequency: 3 times per week  OT Discharge Recommendations: Moderate intensity level of continued care  Equipment Recommended upon Discharge: Wheeled walker  OT - OK to Discharge: Yes  Treatment Interventions: ADL retraining, Functional transfer training, UE strengthening/ROM, Endurance training, Compensatory technique education    Subjective   General:   OT Received On: 10/17/23  General  Reason for Referral: SOB, b/l leg swelling & pain  Past Medical History Relevant to Rehab: CHF, previous TIA/NSTEMI, DVTs/PE (on Warfarin, Lovenox and Plavix), Hx of cocaine abuse, ?schizoaffective/bipolar disease, seizure, HTN, DM, DLD, COPD  Prior to Session Communication: Bedside nurse  Patient Position Received: Bed, 3 rail up, Alarm off, not on at start of session  General Comment: Pt agreeable to OT, motivated to participate however limited standing tolerance/functional mobility d/t pain.  Precautions:  Medical Precautions: Fall precautions     Pain:  Pain Assessment  Pain Assessment: 0-10  Pain Score: 10 - Worst possible pain  Pain Location:  (bilateral LEs, L shoulder)    Objective   Cognition:  Overall Cognitive Status: Within  Functional Limits  Attention: Within Functional Limits           Home Living:  Type of Home: House  Lives With:  (cousin)  Home Adaptive Equipment: Walker rolling or standard, Cane, Wheelchair-manual  Home Layout: One level  Home Access: No concerns  Bathroom Shower/Tub: Walk-in shower  Prior Function:  Level of [place]: Needs assistance with ADLs, Needs assistance with homemaking, Needs assistance with functional transfers  Receives Help From: Family  ADL Assistance:  (Ind with feeding and grooming; assist with bathing, dressing, and toileting.)  IADL History:  IADL Comments:  (- drives)  ADL:  Eating Assistance: Independent  Grooming Assistance: Stand by (Set up A)  Bathing Assistance: Maximal (anticipated)  UE Dressing Assistance: Minimal (anticipated)  LE Dressing Assistance: Maximal (anticipated)  Toileting Assistance with Device: Total (anticipated)    Functional Standing Tolerance:  Time: 1 minute  Activity: Pt tolerated static standing + L lateral side-steps at FWW  Bed Mobility/Transfers: Bed Mobility  Bed Mobility: Yes  Bed Mobility 1  Bed Mobility 1: Supine to sitting  Level of Assistance 1: Minimum assistance  Bed Mobility Comments 1: HOB elevated  Bed Mobility 2  Bed Mobility  2: Sitting to supine  Level of Assistance 2: Contact guard  Bed Mobility 3  Bed Mobility 3: Scooting  Level of Assistance 3: Contact guard (Fwd and L lateral scoot seated at EOB)    Transfers  Transfer: Yes  Transfer 1  Transfer From 1: Sit to  Transfer to 1: Stand  Transfer Device 1: Walker  Transfer Level of Assistance 1:  (Mod Ax2)  Trials/Comments 1: x3 trials from elevated, 1st and 2nd unsuccessful as pt unable to clear bed.  Transfers 2  Transfer From 2: Stand to  Transfer to 2: Sit  Transfer Device 2: Walker  Transfer Level of Assistance 2:  (Min Ax2)  Trials/Comments 2: cued for hand placement       Therapy/Activity: Therapeutic Activity  Therapeutic Activity Performed: Yes  Therapeutic Activity 1: Pt completed L  "lateral side-steps using FWW with Min Ax2.    Sensation:  Sensation Comment: Pt reports \"pins and needles\" in b/l feet.  Strength:  Strength Comments: L UE grossly 3/5, R UE 4/5      Hand Function:  Hand Function  Gross Grasp: Functional  Extremities: RUE   RUE : Within Functional Limits and LUE   LUE: Exceptions to WFL  LUE AROM (degrees)  L Shoulder Flexion  0-170: 70 Degrees  L Elbow Flexion/Extension 0-135-150: 140 °  LUE PROM (degrees)  LUE PROM Comment: L shoulder WFL    Outcome Measures: [facility] Daily Activity  Putting on and taking off regular lower body clothing: A lot  Bathing (including washing, rinsing, drying): A lot  Putting on and taking off regular upper body clothing: A little  Toileting, which includes using toilet, bedpan or urinal: A lot  Taking care of personal grooming such as brushing teeth: A little  Eating Meals: None  Daily Activity - Total Score: 16    and Brief Confusion Assessment Method (bCAM)  Feature 1: Altered Mental Status or Fluctuating Course: No  CAM Result: CAM -    Education Documentation  Precautions, taught by Kourtney Smith OT at 10/17/2023  4:29 PM.  Learner: Patient  Readiness: Acceptance  Method: Explanation, Demonstration  Response: Verbalizes Understanding, Needs Reinforcement    Education Comments  No comments found.         Goals:  Encounter Problems       Encounter Problems (Active)       ADLs       Patient will perform UB and LB bathing with stand by assist level of assistance. (Progressing)       Start:  10/17/23    Expected End:  10/31/23            Patient with complete upper body dressing with set-up level of assistance donning and doffing all UE clothes with no adaptive equipment while edge of bed  (Progressing)       Start:  10/17/23    Expected End:  10/31/23            Patient with complete lower body dressing with minimal assist  level of assistance donning and doffing all LE clothes  with PRN adaptive equipment while edge of bed  and standing (Progressing)  "      Start:  10/17/23    Expected End:  10/31/23            Patient will complete toileting including hygiene clothing management/hygiene with contact guard assist level of assistance. (Progressing)       Start:  10/17/23    Expected End:  10/31/23               MOBILITY       Patient will perform Functional mobility min Household distances/Community Distances with contact guard assist level of assistance and least restrictive device in order to improve safety and functional mobility. (Progressing)       Start:  10/17/23    Expected End:  10/31/23               TRANSFERS       Patient will complete all functional transfers with least restrictive device with contact guard assist level of assistance. (Progressing)       Start:  10/17/23    Expected End:  10/31/23

## 2023-10-17 NOTE — PROGRESS NOTES
Physical Therapy    Physical Therapy Evaluation & Treatment    Patient Name: Manuel Palmer  MRN: [id number]  Today's Date: 10/17/2023   Time Calculation  Start Time: 1456  Stop Time: 1527  Time Calculation (min): 31 min    Assessment/Plan   PT Assessment  PT Assessment Results: Decreased strength, Decreased endurance, Decreased range of motion, Impaired balance, Decreased mobility, Decreased safety awareness, Pain  Rehab Prognosis: Good  End of Session Communication: Bedside nurse  End of Session Patient Position: Bed, 3 rail up, Alarm off, not on at start of session  IP OR SWING BED PT PLAN  Inpatient or Swing Bed: Inpatient  PT Plan  Treatment/Interventions: Bed mobility, Transfer training, Gait training, Stair training, Balance training, Strengthening, Endurance training, Range of motion, Therapeutic exercise, Therapeutic activity, Home exercise program  PT Plan: Skilled PT  PT Frequency: 3 times per week  PT Discharge Recommendations: Moderate intensity level of continued care  Equipment Recommended upon Discharge: Wheeled walker  PT Recommended Transfer Status: Assist x2 (walker)  PT - OK to Discharge: Yes      Subjective     General Visit Information:  General  Reason for Referral: Acute on chronic congestive heart failure, unspecified heart failure type (CMS/HCC), SOB, B leg swelling & pain  Past Medical History Relevant to Rehab: CHF, previous TIA/NSTEMI, DVTs/PE (on Warfarin, Lovenox and Plavix), Hx of cocaine abuse, ?schizoaffective/bipolar disease, seizure, HTN, DM, DLD, COPD  Co-Treatment: OT  Co-Treatment Reason: Cotx with OT for safety during mobility and for D/C  Patient Position Received: Bed, 3 rail up, Alarm off, not on at start of session  General Comment: Patient supine in bed upon arrival and agreeable to participate in tehrapy session. Motivated. Limited by BLE and L shoulder pain  Home Living:  Home Living  Type of Home: House  Lives With:  (cousin)  Home Adaptive Equipment: Wheelchair-manual,  "Walker rolling or standard, Cane  Home Layout: One level  Home Access:  (reports no stairs)  Bathroom Shower/Tub: Walk-in shower  Prior Level of Function:  Prior Function Per Pt/Caregiver Report  Level of [place]: Needs assistance with functional transfers, Needs assistance with ADLs  Receives Help From: Family  Ambulatory Assistance:  (reports Leon with SC, walker, sometimes requires assist, uses W/C more)  Hand Dominance: Right  Precautions:  Precautions  Medical Precautions: Fall precautions (2 falls in the past 6 months)  Vital Signs:       Objective   Pain:  Pain Assessment  Pain Assessment: 0-10  Pain Score: 10 - Worst possible pain  Pain Location:  (BLE's and L shoulder)  Cognition:  Cognition  Overall Cognitive Status: Within Functional Limits    General Assessments:                     Sensation  Sensation Comment: reports \"pins and needles\" B feet                 Static Sitting Balance  Static Sitting-Level of Assistance: Close supervision  Dynamic Sitting Balance  Dynamic Sitting-Balance:  (CGA)    Static Standing Balance  Static Standing-Level of Assistance:  (MinAx1 with walker)  Dynamic Standing Balance  Dynamic Standing-Balance:  (MinAx2 with RW)  Functional Assessments:  Bed Mobility  Bed Mobility: Yes  Bed Mobility 1  Bed Mobility 1: Supine to sitting  Level of Assistance 1: Minimum assistance  Bed Mobility Comments 1: HOB elevated  Bed Mobility 2  Bed Mobility  2: Sitting to supine  Level of Assistance 2: Contact guard  Bed Mobility 3  Bed Mobility 3: Scooting  Level of Assistance 3: Contact guard (fwd and lateral scoot seated at EOB)    Transfer 1  Transfer From 1: Sit to  Transfer to 1: Stand  Transfer Device 1: Walker  Transfer Level of Assistance 1:  (from elevated EOB, unsuccessfull 1st 2 trials, ModAx2 for 3rd trial)  Trials/Comments 1: cues for safe hand placement and sequencing  Transfers 2  Transfer From 2: Stand to  Transfer to 2: Sit  Transfer Level of Assistance 2:  " (MinAx2)  Trials/Comments 2: cues for safe hand placement    Ambulation/Gait Training  Ambulation/Gait Training Performed: Yes  Ambulation/Gait Training 1  Device 1: Rolling walker  Assistance 1:  (MinAx2)  Comments/Distance (ft) 1: 3 sidesteps and 2 backward steps (cues for sequencing)  Extremity/Trunk Assessments:  RUE   RUE : Within Functional Limits  LUE   LUE:  (Limited shoulder flexion AROM)  RLE   RLE : Within Functional Limits  LLE   LLE : Within Functional Limits  Treatments:  Ambulation/Gait Training  Ambulation/Gait Training Performed: Yes  Ambulation/Gait Training 1  Device 1: Rolling walker  Assistance 1:  (MinAx2)  Comments/Distance (ft) 1: 3 sidesteps and 2 backward steps (cues for sequencing)  Transfer 1  Transfer From 1: Sit to  Transfer to 1: Stand  Transfer Device 1: Walker  Transfer Level of Assistance 1:  (from elevated EOB, unsuccessfull 1st 2 trials, ModAx2 for 3rd trial)  Trials/Comments 1: cues for safe hand placement and sequencing  Transfers 2  Transfer From 2: Stand to  Transfer to 2: Sit  Transfer Level of Assistance 2:  (MinAx2)  Trials/Comments 2: cues for safe hand placement  Outcome Measures:  [facility] Basic Mobility  Turning from your back to your side while in a flat bed without using bedrails: A little  Moving from lying on your back to sitting on the side of a flat bed without using bedrails: A little  Moving to and from bed to chair (including a wheelchair): Total  Standing up from a chair using your arms (e.g. wheelchair or bedside chair): Total  To walk in hospital room: Total  Climbing 3-5 steps with railing: Total  Basic Mobility - Total Score: 10    Encounter Problems       Encounter Problems (Active)       Balance       Pt will score>/=24/28 on [place] to demonstrate decreased falls risk and improved balance (Progressing)       Start:  10/17/23    Expected End:  10/31/23               Mobility       Patient will be SBA for ambulation 25 ft with RW (Progressing)       Start:   10/17/23    Expected End:  10/31/23               Transfers       Patient will be S for bed mobility (Progressing)       Start:  10/17/23    Expected End:  10/31/23             Patient will be SBA for sit to stand and bed to chair transfers with RW (Progressing)       Start:  10/17/23    Expected End:  10/31/23                   Education Documentation  Precautions, taught by Brynn Brownlee PT at 10/17/2023  5:11 PM.  Learner: Patient  Readiness: Acceptance  Method: Explanation, Demonstration  Response: Verbalizes Understanding, Needs Reinforcement    Mobility Training, taught by Brynn Brownlee PT at 10/17/2023  5:11 PM.  Learner: Patient  Readiness: Acceptance  Method: Explanation, Demonstration  Response: Verbalizes Understanding, Needs Reinforcement    Precautions, taught by Brynn Brownlee PT at 10/17/2023  5:11 PM.  Learner: Patient  Readiness: Acceptance  Method: Explanation, Demonstration  Response: Verbalizes Understanding, Needs Reinforcement    Education Comments  No comments found.

## 2023-10-18 ENCOUNTER — PHARMACY VISIT (OUTPATIENT)
Dept: PHARMACY | Facility: CLINIC | Age: 48
End: 2023-10-18
Payer: MEDICAID

## 2023-10-18 ENCOUNTER — APPOINTMENT (OUTPATIENT)
Dept: RADIOLOGY | Facility: HOSPITAL | Age: 48
End: 2023-10-18
Payer: COMMERCIAL

## 2023-10-18 PROBLEM — F31.70 BIPOLAR AFFECTIVE DISORDER IN REMISSION (MULTI): Status: ACTIVE | Noted: 2023-10-18

## 2023-10-18 PROBLEM — I21.4 NSTEMI (NON-ST ELEVATED MYOCARDIAL INFARCTION) (MULTI): Status: ACTIVE | Noted: 2023-10-18

## 2023-10-18 PROBLEM — M79.606 LOWER EXTREMITY PAIN: Status: ACTIVE | Noted: 2023-10-18

## 2023-10-18 PROBLEM — M19.90 ARTHRITIS: Status: ACTIVE | Noted: 2023-10-18

## 2023-10-18 LAB
ALBUMIN SERPL BCP-MCNC: 3.4 G/DL (ref 3.4–5)
ANION GAP SERPL CALC-SCNC: 14 MMOL/L (ref 10–20)
APTT PPP: 30 SECONDS (ref 27–38)
BASOPHILS # BLD AUTO: 0.03 X10*3/UL (ref 0–0.1)
BASOPHILS NFR BLD AUTO: 0.6 %
BUN SERPL-MCNC: 19 MG/DL (ref 6–23)
CALCIUM SERPL-MCNC: 9.4 MG/DL (ref 8.6–10.6)
CHLORIDE SERPL-SCNC: 108 MMOL/L (ref 98–107)
CO2 SERPL-SCNC: 24 MMOL/L (ref 21–32)
CREAT SERPL-MCNC: 0.73 MG/DL (ref 0.5–1.05)
EOSINOPHIL # BLD AUTO: 0.21 X10*3/UL (ref 0–0.7)
EOSINOPHIL NFR BLD AUTO: 4.3 %
ERYTHROCYTE [DISTWIDTH] IN BLOOD BY AUTOMATED COUNT: 14.1 % (ref 11.5–14.5)
GFR SERPL CREATININE-BSD FRML MDRD: >90 ML/MIN/1.73M*2
GLUCOSE SERPL-MCNC: 110 MG/DL (ref 74–99)
HCT VFR BLD AUTO: 41.8 % (ref 36–46)
HGB BLD-MCNC: 12.5 G/DL (ref 12–16)
IMM GRANULOCYTES # BLD AUTO: 0.02 X10*3/UL (ref 0–0.7)
IMM GRANULOCYTES NFR BLD AUTO: 0.4 % (ref 0–0.9)
INR PPP: 1 (ref 0.9–1.1)
LYMPHOCYTES # BLD AUTO: 1.69 X10*3/UL (ref 1.2–4.8)
LYMPHOCYTES NFR BLD AUTO: 34.3 %
MAGNESIUM SERPL-MCNC: 2.05 MG/DL (ref 1.6–2.4)
MCH RBC QN AUTO: 30 PG (ref 26–34)
MCHC RBC AUTO-ENTMCNC: 29.9 G/DL (ref 32–36)
MCV RBC AUTO: 101 FL (ref 80–100)
MONOCYTES # BLD AUTO: 0.58 X10*3/UL (ref 0.1–1)
MONOCYTES NFR BLD AUTO: 11.8 %
NEUTROPHILS # BLD AUTO: 2.4 X10*3/UL (ref 1.2–7.7)
NEUTROPHILS NFR BLD AUTO: 48.6 %
NRBC BLD-RTO: 0 /100 WBCS (ref 0–0)
PHOSPHATE SERPL-MCNC: 4.2 MG/DL (ref 2.5–4.9)
PLATELET # BLD AUTO: 240 X10*3/UL (ref 150–450)
PMV BLD AUTO: 9.7 FL (ref 7.5–11.5)
POTASSIUM SERPL-SCNC: 4.1 MMOL/L (ref 3.5–5.3)
PROTHROMBIN TIME: 10.7 SECONDS (ref 9.8–12.8)
RBC # BLD AUTO: 4.16 X10*6/UL (ref 4–5.2)
SODIUM SERPL-SCNC: 142 MMOL/L (ref 136–145)
WBC # BLD AUTO: 4.9 X10*3/UL (ref 4.4–11.3)

## 2023-10-18 PROCEDURE — 73030 X-RAY EXAM OF SHOULDER: CPT | Mod: LEFT SIDE | Performed by: STUDENT IN AN ORGANIZED HEALTH CARE EDUCATION/TRAINING PROGRAM

## 2023-10-18 PROCEDURE — 36415 COLL VENOUS BLD VENIPUNCTURE: CPT

## 2023-10-18 PROCEDURE — 83735 ASSAY OF MAGNESIUM: CPT

## 2023-10-18 PROCEDURE — 2500000004 HC RX 250 GENERAL PHARMACY W/ HCPCS (ALT 636 FOR OP/ED)

## 2023-10-18 PROCEDURE — 1100000001 HC PRIVATE ROOM DAILY

## 2023-10-18 PROCEDURE — 85610 PROTHROMBIN TIME: CPT

## 2023-10-18 PROCEDURE — 2500000005 HC RX 250 GENERAL PHARMACY W/O HCPCS

## 2023-10-18 PROCEDURE — 2500000001 HC RX 250 WO HCPCS SELF ADMINISTERED DRUGS (ALT 637 FOR MEDICARE OP): Performed by: STUDENT IN AN ORGANIZED HEALTH CARE EDUCATION/TRAINING PROGRAM

## 2023-10-18 PROCEDURE — 85025 COMPLETE CBC W/AUTO DIFF WBC: CPT

## 2023-10-18 PROCEDURE — 96372 THER/PROPH/DIAG INJ SC/IM: CPT

## 2023-10-18 PROCEDURE — 2500000001 HC RX 250 WO HCPCS SELF ADMINISTERED DRUGS (ALT 637 FOR MEDICARE OP)

## 2023-10-18 PROCEDURE — 80069 RENAL FUNCTION PANEL: CPT

## 2023-10-18 PROCEDURE — 99232 SBSQ HOSP IP/OBS MODERATE 35: CPT | Performed by: INTERNAL MEDICINE

## 2023-10-18 PROCEDURE — 73030 X-RAY EXAM OF SHOULDER: CPT | Mod: LT

## 2023-10-18 RX ORDER — TRAMADOL HYDROCHLORIDE 50 MG/1
50 TABLET, FILM COATED ORAL EVERY 12 HOURS PRN
Status: DISCONTINUED | OUTPATIENT
Start: 2023-10-18 | End: 2023-10-19

## 2023-10-18 RX ORDER — OXCARBAZEPINE 300 MG/1
300 TABLET, FILM COATED ORAL NIGHTLY
Qty: 30 TABLET | Refills: 1
Start: 2023-10-18

## 2023-10-18 RX ORDER — LIDOCAINE 560 MG/1
1 PATCH PERCUTANEOUS; TOPICAL; TRANSDERMAL DAILY
Qty: 30 PATCH | Refills: 2
Start: 2023-10-18

## 2023-10-18 RX ORDER — AMOXICILLIN 250 MG
1 CAPSULE ORAL NIGHTLY
Qty: 30 TABLET | Refills: 1 | Status: SHIPPED | OUTPATIENT
Start: 2023-10-18 | End: 2023-10-20 | Stop reason: SDUPTHER

## 2023-10-18 RX ORDER — GABAPENTIN 300 MG/1
300 CAPSULE ORAL EVERY 8 HOURS PRN
Qty: 15 CAPSULE | Refills: 1 | Status: SHIPPED | OUTPATIENT
Start: 2023-10-18 | End: 2023-10-20 | Stop reason: SDUPTHER

## 2023-10-18 RX ORDER — FUROSEMIDE 40 MG/1
40 TABLET ORAL DAILY
Qty: 30 TABLET | Refills: 1
Start: 2023-10-18 | End: 2023-10-20 | Stop reason: HOSPADM

## 2023-10-18 RX ORDER — WARFARIN 10 MG/1
TABLET ORAL
Qty: 10 TABLET | Refills: 0 | Status: SHIPPED | OUTPATIENT
Start: 2023-10-18 | End: 2024-10-17

## 2023-10-18 RX ORDER — ATORVASTATIN CALCIUM 80 MG/1
80 TABLET, FILM COATED ORAL NIGHTLY
Qty: 30 TABLET | Refills: 2
Start: 2023-10-18 | End: 2023-10-20 | Stop reason: SDUPTHER

## 2023-10-18 RX ORDER — ACETAMINOPHEN 325 MG/1
975 TABLET ORAL EVERY 8 HOURS PRN
Qty: 30 TABLET | Refills: 0
Start: 2023-10-18

## 2023-10-18 RX ORDER — ENOXAPARIN SODIUM 150 MG/ML
1 INJECTION SUBCUTANEOUS EVERY 12 HOURS
Qty: 60 EACH | Refills: 2
Start: 2023-10-18

## 2023-10-18 RX ADMIN — SPIRONOLACTONE 25 MG: 25 TABLET ORAL at 10:37

## 2023-10-18 RX ADMIN — WARFARIN SODIUM 10 MG: 10 TABLET ORAL at 18:36

## 2023-10-18 RX ADMIN — OXCARBAZEPINE 300 MG: 300 TABLET, FILM COATED ORAL at 20:32

## 2023-10-18 RX ADMIN — FUROSEMIDE 40 MG: 40 TABLET ORAL at 10:38

## 2023-10-18 RX ADMIN — LIDOCAINE 1 PATCH: 4 PATCH TOPICAL at 10:38

## 2023-10-18 RX ADMIN — ARIPIPRAZOLE 10 MG: 10 TABLET ORAL at 10:38

## 2023-10-18 RX ADMIN — SENNOSIDES AND DOCUSATE SODIUM 1 TABLET: 8.6; 5 TABLET ORAL at 20:32

## 2023-10-18 RX ADMIN — ATORVASTATIN CALCIUM 80 MG: 80 TABLET, FILM COATED ORAL at 20:32

## 2023-10-18 RX ADMIN — GABAPENTIN 300 MG: 300 CAPSULE ORAL at 10:43

## 2023-10-18 RX ADMIN — ENOXAPARIN SODIUM 120 MG: 150 INJECTION SUBCUTANEOUS at 20:33

## 2023-10-18 RX ADMIN — CARVEDILOL 3.12 MG: 3.12 TABLET, FILM COATED ORAL at 18:28

## 2023-10-18 RX ADMIN — GABAPENTIN 300 MG: 300 CAPSULE ORAL at 18:28

## 2023-10-18 RX ADMIN — CLOPIDOGREL BISULFATE 75 MG: 75 TABLET ORAL at 10:38

## 2023-10-18 RX ADMIN — BUPROPION HYDROCHLORIDE 150 MG: 150 TABLET, EXTENDED RELEASE ORAL at 10:37

## 2023-10-18 RX ADMIN — ENOXAPARIN SODIUM 120 MG: 150 INJECTION SUBCUTANEOUS at 10:41

## 2023-10-18 RX ADMIN — TRAMADOL HYDROCHLORIDE 50 MG: 50 TABLET, COATED ORAL at 18:28

## 2023-10-18 RX ADMIN — CARVEDILOL 3.12 MG: 3.12 TABLET, FILM COATED ORAL at 10:38

## 2023-10-18 RX ADMIN — MELATONIN 6 MG: 3 TAB ORAL at 20:32

## 2023-10-18 RX ADMIN — ACETAMINOPHEN 325 MG: 325 TABLET ORAL at 20:33

## 2023-10-18 ASSESSMENT — PAIN SCALES - GENERAL
PAINLEVEL_OUTOF10: 8
PAINLEVEL_OUTOF10: 8
PAINLEVEL_OUTOF10: 10 - WORST POSSIBLE PAIN

## 2023-10-18 ASSESSMENT — ACTIVITIES OF DAILY LIVING (ADL): LACK_OF_TRANSPORTATION: YES

## 2023-10-18 ASSESSMENT — PAIN - FUNCTIONAL ASSESSMENT: PAIN_FUNCTIONAL_ASSESSMENT: 0-10

## 2023-10-18 NOTE — PROGRESS NOTES
SW requested assistance from [facility] in verifying pt [place] Medicaid insurance. RN TCC notified.    - Caryn Ponce MSW, LSW
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
## 2023-10-19 LAB
ALBUMIN SERPL BCP-MCNC: 3.5 G/DL (ref 3.4–5)
ANION GAP SERPL CALC-SCNC: 14 MMOL/L (ref 10–20)
APPEARANCE UR: ABNORMAL
APTT PPP: 44 SECONDS (ref 27–38)
BACTERIA #/AREA URNS AUTO: ABNORMAL /HPF
BASOPHILS # BLD AUTO: 0.03 X10*3/UL (ref 0–0.1)
BASOPHILS NFR BLD AUTO: 0.6 %
BILIRUB UR STRIP.AUTO-MCNC: NEGATIVE MG/DL
BUN SERPL-MCNC: 19 MG/DL (ref 6–23)
CALCIUM SERPL-MCNC: 9.3 MG/DL (ref 8.6–10.6)
CHLORIDE SERPL-SCNC: 109 MMOL/L (ref 98–107)
CO2 SERPL-SCNC: 22 MMOL/L (ref 21–32)
COLOR UR: YELLOW
CREAT SERPL-MCNC: 0.66 MG/DL (ref 0.5–1.05)
EOSINOPHIL # BLD AUTO: 0.26 X10*3/UL (ref 0–0.7)
EOSINOPHIL NFR BLD AUTO: 5.1 %
ERYTHROCYTE [DISTWIDTH] IN BLOOD BY AUTOMATED COUNT: 14.4 % (ref 11.5–14.5)
GFR SERPL CREATININE-BSD FRML MDRD: >90 ML/MIN/1.73M*2
GLUCOSE SERPL-MCNC: 105 MG/DL (ref 74–99)
GLUCOSE UR STRIP.AUTO-MCNC: NEGATIVE MG/DL
HCT VFR BLD AUTO: 39.9 % (ref 36–46)
HGB BLD-MCNC: 12.3 G/DL (ref 12–16)
IMM GRANULOCYTES # BLD AUTO: 0.01 X10*3/UL (ref 0–0.7)
IMM GRANULOCYTES NFR BLD AUTO: 0.2 % (ref 0–0.9)
INR PPP: 1.4 (ref 0.9–1.1)
KETONES UR STRIP.AUTO-MCNC: NEGATIVE MG/DL
LEUKOCYTE ESTERASE UR QL STRIP.AUTO: ABNORMAL
LYMPHOCYTES # BLD AUTO: 1.82 X10*3/UL (ref 1.2–4.8)
LYMPHOCYTES NFR BLD AUTO: 35.8 %
MAGNESIUM SERPL-MCNC: 1.98 MG/DL (ref 1.6–2.4)
MCH RBC QN AUTO: 29.5 PG (ref 26–34)
MCHC RBC AUTO-ENTMCNC: 30.8 G/DL (ref 32–36)
MCV RBC AUTO: 96 FL (ref 80–100)
MONOCYTES # BLD AUTO: 0.58 X10*3/UL (ref 0.1–1)
MONOCYTES NFR BLD AUTO: 11.4 %
MUCOUS THREADS #/AREA URNS AUTO: ABNORMAL /LPF
NEUTROPHILS # BLD AUTO: 2.38 X10*3/UL (ref 1.2–7.7)
NEUTROPHILS NFR BLD AUTO: 46.9 %
NITRITE UR QL STRIP.AUTO: POSITIVE
NRBC BLD-RTO: 0 /100 WBCS (ref 0–0)
PH UR STRIP.AUTO: 5 [PH]
PHOSPHATE SERPL-MCNC: 5.1 MG/DL (ref 2.5–4.9)
PLATELET # BLD AUTO: 227 X10*3/UL (ref 150–450)
PMV BLD AUTO: 8.9 FL (ref 7.5–11.5)
POTASSIUM SERPL-SCNC: 4.1 MMOL/L (ref 3.5–5.3)
PROT UR STRIP.AUTO-MCNC: NEGATIVE MG/DL
PROTHROMBIN TIME: 15.6 SECONDS (ref 9.8–12.8)
RBC # BLD AUTO: 4.17 X10*6/UL (ref 4–5.2)
RBC # UR STRIP.AUTO: ABNORMAL /UL
RBC #/AREA URNS AUTO: ABNORMAL /HPF
SODIUM SERPL-SCNC: 141 MMOL/L (ref 136–145)
SP GR UR STRIP.AUTO: 1.03
SQUAMOUS #/AREA URNS AUTO: ABNORMAL /HPF
UROBILINOGEN UR STRIP.AUTO-MCNC: <2 MG/DL
WBC # BLD AUTO: 5.1 X10*3/UL (ref 4.4–11.3)
WBC #/AREA URNS AUTO: >50 /HPF
WBC CLUMPS #/AREA URNS AUTO: ABNORMAL /HPF

## 2023-10-19 PROCEDURE — 81001 URINALYSIS AUTO W/SCOPE: CPT | Performed by: STUDENT IN AN ORGANIZED HEALTH CARE EDUCATION/TRAINING PROGRAM

## 2023-10-19 PROCEDURE — 2500000001 HC RX 250 WO HCPCS SELF ADMINISTERED DRUGS (ALT 637 FOR MEDICARE OP): Performed by: STUDENT IN AN ORGANIZED HEALTH CARE EDUCATION/TRAINING PROGRAM

## 2023-10-19 PROCEDURE — 96372 THER/PROPH/DIAG INJ SC/IM: CPT

## 2023-10-19 PROCEDURE — 80069 RENAL FUNCTION PANEL: CPT | Mod: CMCLAB | Performed by: STUDENT IN AN ORGANIZED HEALTH CARE EDUCATION/TRAINING PROGRAM

## 2023-10-19 PROCEDURE — 83735 ASSAY OF MAGNESIUM: CPT | Mod: CMCLAB | Performed by: STUDENT IN AN ORGANIZED HEALTH CARE EDUCATION/TRAINING PROGRAM

## 2023-10-19 PROCEDURE — 85025 COMPLETE CBC W/AUTO DIFF WBC: CPT | Performed by: STUDENT IN AN ORGANIZED HEALTH CARE EDUCATION/TRAINING PROGRAM

## 2023-10-19 PROCEDURE — 99232 SBSQ HOSP IP/OBS MODERATE 35: CPT | Performed by: INTERNAL MEDICINE

## 2023-10-19 PROCEDURE — 85730 THROMBOPLASTIN TIME PARTIAL: CPT | Performed by: STUDENT IN AN ORGANIZED HEALTH CARE EDUCATION/TRAINING PROGRAM

## 2023-10-19 PROCEDURE — 36415 COLL VENOUS BLD VENIPUNCTURE: CPT | Performed by: STUDENT IN AN ORGANIZED HEALTH CARE EDUCATION/TRAINING PROGRAM

## 2023-10-19 PROCEDURE — 97116 GAIT TRAINING THERAPY: CPT | Mod: GP,CQ

## 2023-10-19 PROCEDURE — 2500000005 HC RX 250 GENERAL PHARMACY W/O HCPCS

## 2023-10-19 PROCEDURE — 2500000001 HC RX 250 WO HCPCS SELF ADMINISTERED DRUGS (ALT 637 FOR MEDICARE OP)

## 2023-10-19 PROCEDURE — 2500000004 HC RX 250 GENERAL PHARMACY W/ HCPCS (ALT 636 FOR OP/ED)

## 2023-10-19 PROCEDURE — 97110 THERAPEUTIC EXERCISES: CPT | Mod: GP,CQ

## 2023-10-19 PROCEDURE — 1100000001 HC PRIVATE ROOM DAILY

## 2023-10-19 PROCEDURE — 2500000004 HC RX 250 GENERAL PHARMACY W/ HCPCS (ALT 636 FOR OP/ED): Performed by: STUDENT IN AN ORGANIZED HEALTH CARE EDUCATION/TRAINING PROGRAM

## 2023-10-19 RX ORDER — POLYETHYLENE GLYCOL 3350 17 G/17G
17 POWDER, FOR SOLUTION ORAL DAILY
Status: DISCONTINUED | OUTPATIENT
Start: 2023-10-19 | End: 2023-10-19

## 2023-10-19 RX ORDER — WARFARIN 7.5 MG/1
7.5 TABLET ORAL DAILY
Status: DISCONTINUED | OUTPATIENT
Start: 2023-10-19 | End: 2023-10-20 | Stop reason: HOSPADM

## 2023-10-19 RX ORDER — LIDOCAINE 560 MG/1
1 PATCH PERCUTANEOUS; TOPICAL; TRANSDERMAL DAILY
Status: DISCONTINUED | OUTPATIENT
Start: 2023-10-19 | End: 2023-10-19

## 2023-10-19 RX ORDER — DICLOFENAC SODIUM 10 MG/G
4 GEL TOPICAL 4 TIMES DAILY PRN
Status: DISCONTINUED | OUTPATIENT
Start: 2023-10-19 | End: 2023-10-20 | Stop reason: HOSPADM

## 2023-10-19 RX ORDER — BISACODYL 10 MG/1
10 SUPPOSITORY RECTAL ONCE
Status: COMPLETED | OUTPATIENT
Start: 2023-10-19 | End: 2023-10-19

## 2023-10-19 RX ORDER — POLYETHYLENE GLYCOL 3350 17 G/17G
34 POWDER, FOR SOLUTION ORAL DAILY
Status: DISCONTINUED | OUTPATIENT
Start: 2023-10-20 | End: 2023-10-20 | Stop reason: HOSPADM

## 2023-10-19 RX ORDER — TRAMADOL HYDROCHLORIDE 50 MG/1
50 TABLET, FILM COATED ORAL EVERY 6 HOURS PRN
Status: DISCONTINUED | OUTPATIENT
Start: 2023-10-19 | End: 2023-10-20 | Stop reason: HOSPADM

## 2023-10-19 RX ADMIN — POLYETHYLENE GLYCOL 3350 17 G: 17 POWDER, FOR SOLUTION ORAL at 15:24

## 2023-10-19 RX ADMIN — FUROSEMIDE 40 MG: 40 TABLET ORAL at 08:56

## 2023-10-19 RX ADMIN — ENOXAPARIN SODIUM 120 MG: 150 INJECTION SUBCUTANEOUS at 20:08

## 2023-10-19 RX ADMIN — MELATONIN 6 MG: 3 TAB ORAL at 20:07

## 2023-10-19 RX ADMIN — BISACODYL 10 MG: 10 SUPPOSITORY RECTAL at 15:23

## 2023-10-19 RX ADMIN — SPIRONOLACTONE 25 MG: 25 TABLET ORAL at 08:56

## 2023-10-19 RX ADMIN — CARVEDILOL 3.12 MG: 3.12 TABLET, FILM COATED ORAL at 08:56

## 2023-10-19 RX ADMIN — CLOPIDOGREL BISULFATE 75 MG: 75 TABLET ORAL at 08:57

## 2023-10-19 RX ADMIN — WARFARIN SODIUM 7.5 MG: 7.5 TABLET ORAL at 19:07

## 2023-10-19 RX ADMIN — ATORVASTATIN CALCIUM 80 MG: 80 TABLET, FILM COATED ORAL at 20:07

## 2023-10-19 RX ADMIN — CARVEDILOL 3.12 MG: 3.12 TABLET, FILM COATED ORAL at 17:02

## 2023-10-19 RX ADMIN — SENNOSIDES AND DOCUSATE SODIUM 1 TABLET: 8.6; 5 TABLET ORAL at 20:07

## 2023-10-19 RX ADMIN — TRAMADOL HYDROCHLORIDE 50 MG: 50 TABLET, COATED ORAL at 20:06

## 2023-10-19 RX ADMIN — BUPROPION HYDROCHLORIDE 150 MG: 150 TABLET, EXTENDED RELEASE ORAL at 08:57

## 2023-10-19 RX ADMIN — ENOXAPARIN SODIUM 120 MG: 150 INJECTION SUBCUTANEOUS at 08:56

## 2023-10-19 RX ADMIN — ACETAMINOPHEN 975 MG: 325 TABLET ORAL at 17:02

## 2023-10-19 RX ADMIN — TRAMADOL HYDROCHLORIDE 50 MG: 50 TABLET, COATED ORAL at 11:29

## 2023-10-19 RX ADMIN — ARIPIPRAZOLE 10 MG: 10 TABLET ORAL at 08:57

## 2023-10-19 RX ADMIN — GABAPENTIN 400 MG: 300 CAPSULE ORAL at 20:07

## 2023-10-19 RX ADMIN — ACETAMINOPHEN 975 MG: 325 TABLET ORAL at 03:55

## 2023-10-19 RX ADMIN — LIDOCAINE 1 PATCH: 4 PATCH TOPICAL at 08:59

## 2023-10-19 RX ADMIN — GABAPENTIN 300 MG: 300 CAPSULE ORAL at 03:55

## 2023-10-19 RX ADMIN — OXCARBAZEPINE 300 MG: 300 TABLET, FILM COATED ORAL at 20:08

## 2023-10-19 SDOH — ECONOMIC STABILITY: HOUSING INSECURITY: IN THE PAST 12 MONTHS HAS THE ELECTRIC, GAS, OIL, OR WATER COMPANY THREATENED TO SHUT OFF SERVICES IN YOUR HOME?: NO

## 2023-10-19 SDOH — ECONOMIC STABILITY: INCOME INSECURITY: IN THE LAST 12 MONTHS, WAS THERE A TIME WHEN YOU WERE NOT ABLE TO PAY THE MORTGAGE OR RENT ON TIME?: NO

## 2023-10-19 SDOH — ECONOMIC STABILITY: FOOD INSECURITY: WITHIN THE PAST 12 MONTHS, THE FOOD YOU BOUGHT JUST DIDN'T LAST AND YOU DIDN'T HAVE MONEY TO GET MORE.: SOMETIMES TRUE

## 2023-10-19 SDOH — ECONOMIC STABILITY: GENERAL

## 2023-10-19 SDOH — ECONOMIC STABILITY: TRANSPORTATION INSECURITY: IN THE PAST 12 MONTHS, HAS LACK OF TRANSPORTATION KEPT YOU FROM MEDICAL APPOINTMENTS OR FROM GETTING MEDICATIONS?: NO

## 2023-10-19 SDOH — ECONOMIC STABILITY: FOOD INSECURITY: WITHIN THE PAST 12 MONTHS, THE FOOD YOU BOUGHT JUST DIDN’T LAST AND YOU DIDN’T HAVE MONEY TO GET MORE.: SOMETIMES TRUE

## 2023-10-19 SDOH — ECONOMIC STABILITY: TRANSPORTATION INSECURITY

## 2023-10-19 SDOH — ECONOMIC STABILITY: HOUSING INSECURITY: IN THE LAST 12 MONTHS, HOW MANY PLACES HAVE YOU LIVED?: 2

## 2023-10-19 SDOH — ECONOMIC STABILITY: FOOD INSECURITY: WITHIN THE PAST 12 MONTHS, YOU WORRIED THAT YOUR FOOD WOULD RUN OUT BEFORE YOU GOT MONEY TO BUY MORE.: SOMETIMES TRUE

## 2023-10-19 SDOH — ECONOMIC STABILITY: HOUSING INSECURITY

## 2023-10-19 SDOH — ECONOMIC STABILITY: TRANSPORTATION INSECURITY
IN THE PAST 12 MONTHS, HAS THE LACK OF TRANSPORTATION KEPT YOU FROM MEDICAL APPOINTMENTS OR FROM GETTING MEDICATIONS?: NO

## 2023-10-19 SDOH — ECONOMIC STABILITY: HOUSING INSECURITY: IN THE LAST 12 MONTHS, WAS THERE A TIME WHEN YOU WERE NOT ABLE TO PAY THE MORTGAGE OR RENT ON TIME?: NO

## 2023-10-19 SDOH — ECONOMIC STABILITY: FOOD INSECURITY

## 2023-10-19 ASSESSMENT — PAIN SCALES - GENERAL
PAINLEVEL_OUTOF10: 8
PAINLEVEL_OUTOF10: 10 - WORST POSSIBLE PAIN
PAINLEVEL_OUTOF10: 8
PAINLEVEL_OUTOF10: 7
PAINLEVEL_OUTOF10: 7

## 2023-10-19 ASSESSMENT — COGNITIVE AND FUNCTIONAL STATUS - GENERAL
DRESSING REGULAR LOWER BODY CLOTHING: A LOT
WALKING IN HOSPITAL ROOM: A LOT
DAILY ACTIVITIY SCORE: 16
STANDING UP FROM CHAIR USING ARMS: A LOT
PERSONAL GROOMING: A LITTLE
CLIMB 3 TO 5 STEPS WITH RAILING: TOTAL
CLIMB 3 TO 5 STEPS WITH RAILING: TOTAL
STANDING UP FROM CHAIR USING ARMS: A LOT
MOVING TO AND FROM BED TO CHAIR: A LOT
MOBILITY SCORE: 12
WALKING IN HOSPITAL ROOM: A LOT
TURNING FROM BACK TO SIDE WHILE IN FLAT BAD: A LITTLE
MOBILITY SCORE: 13
HELP NEEDED FOR BATHING: A LOT
MOVING FROM LYING ON BACK TO SITTING ON SIDE OF FLAT BED WITH BEDRAILS: A LITTLE
DRESSING REGULAR UPPER BODY CLOTHING: A LITTLE
TOILETING: A LOT
MOVING TO AND FROM BED TO CHAIR: A LOT
TURNING FROM BACK TO SIDE WHILE IN FLAT BAD: A LOT
MOVING FROM LYING ON BACK TO SITTING ON SIDE OF FLAT BED WITH BEDRAILS: A LITTLE

## 2023-10-19 ASSESSMENT — ACTIVITIES OF DAILY LIVING (ADL): LACK_OF_TRANSPORTATION: NO

## 2023-10-19 ASSESSMENT — PAIN DESCRIPTION - DESCRIPTORS
DESCRIPTORS: CRAMPING;BURNING;DISCOMFORT
DESCRIPTORS: ACHING

## 2023-10-19 ASSESSMENT — PAIN - FUNCTIONAL ASSESSMENT: PAIN_FUNCTIONAL_ASSESSMENT: 0-10

## 2023-10-19 ASSESSMENT — SOCIAL DETERMINANTS OF HEALTH (SDOH): IN THE PAST 12 MONTHS, HAS THE ELECTRIC, GAS, OIL, OR WATER COMPANY THREATENED TO SHUT OFF SERVICE IN YOUR HOME?: NO

## 2023-10-19 NOTE — PROGRESS NOTES
Transitional Care Coordination Progress Note:  Plan per Medical/Surgical team: Pt medically stable for discharge.   Payor source: [employer]  Discharge disposition: [facility]  Potential Barriers: precert  ADOD: 10/21  This RN met with the pt to discuss discharge planning needs. Pt made aware that per HRS she doesn't have an active [place] Medicaid plan. Pt agreeable to auth being initiated for [place] facility of choice ([facility]). Pt made aware that discharge plan to [place] would be difficult as she does not have an active plan. Pt visibly upset but agreeable to work with the SW at the [facility] to assist with transfer out of state.  Tammy Minor RN Transitional Care Coordinator

## 2023-10-19 NOTE — PROGRESS NOTES
Physical Therapy    Physical Therapy Treatment    Patient Name: Manuel Palmer  MRN: [id number]  Today's Date: 10/19/2023  Time Calculation  Start Time: 1137     Assessment/Plan   PT Assessment  PT Assessment Results: Decreased strength, Decreased endurance, Decreased range of motion, Impaired balance, Decreased mobility, Decreased safety awareness, Pain  Rehab Prognosis: Good  Evaluation/Treatment Tolerance: Patient limited by pain, Patient limited by fatigue  End of Session Communication: Bedside nurse  Assessment Comment: Pt has limited standing/ambulation tolerance due to pain in Bilateral LE' and L shoulder. Pt remains appropriate for continued therapy upon discharge at a moderate intensity level to focus on improved functional mobility, balance and activity tolerance.  End of Session Patient Position: Bed, 3 rail up     PT Plan  Treatment/Interventions: Bed mobility, Transfer training, Gait training, Stair training, Balance training, Strengthening, Endurance training, Range of motion, Therapeutic exercise, Therapeutic activity, Home exercise program  PT Plan: Skilled PT  PT Frequency: 3 times per week  PT Discharge Recommendations: Moderate intensity level of continued care  Equipment Recommended upon Discharge: Wheeled walker  PT Recommended Transfer Status: Assist x2 (walker)  PT - OK to Discharge: Yes    General Visit Information:   PT  Visit  PT Received On: 10/19/23  General  Reason for Referral: SOB, b/l leg swelling & pain  Past Medical History Relevant to Rehab: CHF, previous TIA/NSTEMI, DVTs/PE (on Warfarin, Lovenox and Plavix), Hx of cocaine abuse, ?schizoaffective/bipolar disease, seizure, HTN, DM, DLD, COPD  General Comment: Pt agreeable to participate in therapy session.    Subjective   Precautions:  Precautions  Medical Precautions: Fall precautions (2 falls in the past 6 months)    Objective   Pain:  Pain Assessment  Pain Assessment: 0-10  Pain Score: 7  Pain Location:  (Bilateral LE's)  Pain  Descriptors: Aching  Pain 2  Pain Score 2: 8  Pain Location 2: Shoulder  Pain Orientation 2: Left  Pain Descriptors 2: Cramping, Burning, Discomfort  Cognition:  Cognition  Overall Cognitive Status: Within Functional Limits    Activity Tolerance:  Activity Tolerance  Endurance: Tolerates less than 10 min exercise, no significant change in vital signs  Treatments:  Therapeutic Exercise  Therapeutic Exercise Performed: Yes  Therapeutic Exercise Activity 1: Seated Bilateral LE: ankle pumps, LAQ, marching, GS x10 each    Bed Mobility 1  Bed Mobility 1: Supine to sitting  Level of Assistance 1: Minimum assistance  Bed Mobility 2  Bed Mobility  2: Sitting to supine  Level of Assistance 2: Contact guard    Ambulation/Gait Training  Ambulation/Gait Training Performed: Yes  Ambulation/Gait Training 1  Surface 1: Level tile  Device 1: Rolling walker  Assistance 1: Minimum assistance  Quality of Gait 1: Narrow base of support  Comments/Distance (ft) 1: 10 feet (Pt demonstrated antalgic gait throughout bout.)  Transfer 1  Transfer From 1: Sit to  Transfer to 1: Stand  Transfer Device 1: Walker  Transfer Level of Assistance 1: Moderate assistance  Transfers 2  Transfer From 2: Stand to  Transfer to 2: Sit  Transfer Device 2: Walker  Transfer Level of Assistance 2: Minimum assistance    Outcome Measures:  [facility] Basic Mobility  Turning from your back to your side while in a flat bed without using bedrails: A little  Moving from lying on your back to sitting on the side of a flat bed without using bedrails: A lot  Moving to and from bed to chair (including a wheelchair): A lot  Standing up from a chair using your arms (e.g. wheelchair or bedside chair): A lot  To walk in hospital room: A lot  Climbing 3-5 steps with railing: Total  Basic Mobility - Total Score: 12    Education Documentation  Precautions, taught by Sharmaine Musa PTA at 10/19/2023 12:44 PM.  Learner: Patient  Readiness: Acceptance  Method: Explanation  Response:  Verbalizes Understanding    Mobility Training, taught by Sharmaine Musa PTA at 10/19/2023 12:44 PM.  Learner: Patient  Readiness: Acceptance  Method: Explanation  Response: Verbalizes Understanding    Education Comments  No comments found.        OP EDUCATION:       Encounter Problems       Encounter Problems (Active)       Balance       Pt will score>/=24/28 on Tinetti to demonstrate decreased falls risk and improved balance (Progressing)       Start:  10/17/23    Expected End:  10/31/23               Mobility       Patient will be SBA for ambulation 25 ft with RW (Progressing)       Start:  10/17/23    Expected End:  10/31/23               Transfers       Patient will be S for bed mobility (Progressing)       Start:  10/17/23    Expected End:  10/31/23             Patient will be SBA for sit to stand and bed to chair transfers with RW (Progressing)       Start:  10/17/23    Expected End:  10/31/23
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
## 2023-10-19 NOTE — CARE PLAN
Problem: Fall/Injury  Goal: Not fall by end of shift  10/19/2023 0014 by Joe Baeza RN  Outcome: Progressing  10/19/2023 0014 by Joe Baeza RN  Outcome: Progressing  Goal: Be free from injury by end of the shift  10/19/2023 0014 by Joe Baeza RN  Outcome: Progressing  10/19/2023 0014 by Joe Baeza RN  Outcome: Progressing  Goal: Verbalize understanding of personal risk factors for fall in the hospital  10/19/2023 0014 by Joe Baeza RN  Outcome: Progressing  10/19/2023 0014 by Joe Baeza RN  Outcome: Progressing  Goal: Verbalize understanding of risk factor reduction measures to prevent injury from fall in the home  10/19/2023 0014 by Joe Baeza RN  Outcome: Progressing  10/19/2023 0014 by Joe Baeza RN  Outcome: Progressing  Goal: Use assistive devices by end of the shift  10/19/2023 0014 by Joe Baeza RN  Outcome: Progressing  10/19/2023 0014 by Joe Baeza RN  Outcome: Progressing  Goal: Pace activities to prevent fatigue by end of the shift  10/19/2023 0014 by Joe Baeza RN  Outcome: Progressing  10/19/2023 0014 by Joe Baeza RN  Outcome: Progressing     Problem: Skin  Goal: Decreased wound size/increased tissue granulation at next dressing change  Outcome: Progressing  Goal: Participates in plan/prevention/treatment measures  Outcome: Progressing  Goal: Prevent/manage excess moisture  Outcome: Progressing  Goal: Prevent/minimize sheer/friction injuries  Outcome: Progressing  Flowsheets (Taken 10/19/2023 0014)  Prevent/minimize sheer/friction injuries:   HOB 30 degrees or less   Turn/reposition every 2 hours/use positioning/transfer devices  Goal: Promote/optimize nutrition  Outcome: Progressing  Goal: Promote skin healing  Outcome: Progressing

## 2023-10-19 NOTE — PROGRESS NOTES
10/19/23 PCN note:   precert for [facility] was requested to be escalated by the direct submit team, transport placed in will call via [employer].     Anna Garcia

## 2023-10-20 ENCOUNTER — PHARMACY VISIT (OUTPATIENT)
Dept: PHARMACY | Facility: CLINIC | Age: 48
End: 2023-10-20
Payer: MEDICAID

## 2023-10-20 VITALS
OXYGEN SATURATION: 98 % | TEMPERATURE: 97.7 F | HEIGHT: 69 IN | WEIGHT: 270 LBS | DIASTOLIC BLOOD PRESSURE: 60 MMHG | RESPIRATION RATE: 19 BRPM | HEART RATE: 69 BPM | BODY MASS INDEX: 39.99 KG/M2 | SYSTOLIC BLOOD PRESSURE: 119 MMHG

## 2023-10-20 PROBLEM — I21.4 NSTEMI (NON-ST ELEVATED MYOCARDIAL INFARCTION) (MULTI): Status: RESOLVED | Noted: 2023-10-18 | Resolved: 2023-10-20

## 2023-10-20 PROBLEM — N39.0 UTI (URINARY TRACT INFECTION): Status: ACTIVE | Noted: 2023-10-20

## 2023-10-20 PROBLEM — K59.09 OTHER CONSTIPATION: Status: ACTIVE | Noted: 2023-10-20

## 2023-10-20 PROBLEM — Z86.718 HISTORY OF DVT (DEEP VEIN THROMBOSIS): Status: RESOLVED | Noted: 2023-10-16 | Resolved: 2023-10-20

## 2023-10-20 LAB
ALBUMIN SERPL BCP-MCNC: 3.4 G/DL (ref 3.4–5)
ANION GAP SERPL CALC-SCNC: 15 MMOL/L (ref 10–20)
APTT PPP: 47 SECONDS (ref 27–38)
BASOPHILS # BLD AUTO: 0.04 X10*3/UL (ref 0–0.1)
BASOPHILS NFR BLD AUTO: 0.8 %
BUN SERPL-MCNC: 16 MG/DL (ref 6–23)
CALCIUM SERPL-MCNC: 9 MG/DL (ref 8.6–10.6)
CHLORIDE SERPL-SCNC: 108 MMOL/L (ref 98–107)
CO2 SERPL-SCNC: 23 MMOL/L (ref 21–32)
CREAT SERPL-MCNC: 0.61 MG/DL (ref 0.5–1.05)
EOSINOPHIL # BLD AUTO: 0.25 X10*3/UL (ref 0–0.7)
EOSINOPHIL NFR BLD AUTO: 5.1 %
ERYTHROCYTE [DISTWIDTH] IN BLOOD BY AUTOMATED COUNT: 14.6 % (ref 11.5–14.5)
GFR SERPL CREATININE-BSD FRML MDRD: >90 ML/MIN/1.73M*2
GLUCOSE SERPL-MCNC: 99 MG/DL (ref 74–99)
HCT VFR BLD AUTO: 37.8 % (ref 36–46)
HGB BLD-MCNC: 11.7 G/DL (ref 12–16)
IMM GRANULOCYTES # BLD AUTO: 0.02 X10*3/UL (ref 0–0.7)
IMM GRANULOCYTES NFR BLD AUTO: 0.4 % (ref 0–0.9)
INR PPP: 1.8 (ref 0.9–1.1)
LYMPHOCYTES # BLD AUTO: 1.68 X10*3/UL (ref 1.2–4.8)
LYMPHOCYTES NFR BLD AUTO: 34.3 %
MAGNESIUM SERPL-MCNC: 2.08 MG/DL (ref 1.6–2.4)
MCH RBC QN AUTO: 29.9 PG (ref 26–34)
MCHC RBC AUTO-ENTMCNC: 31 G/DL (ref 32–36)
MCV RBC AUTO: 97 FL (ref 80–100)
MONOCYTES # BLD AUTO: 0.58 X10*3/UL (ref 0.1–1)
MONOCYTES NFR BLD AUTO: 11.8 %
NEUTROPHILS # BLD AUTO: 2.33 X10*3/UL (ref 1.2–7.7)
NEUTROPHILS NFR BLD AUTO: 47.6 %
NRBC BLD-RTO: 0 /100 WBCS (ref 0–0)
PHOSPHATE SERPL-MCNC: 4.6 MG/DL (ref 2.5–4.9)
PLATELET # BLD AUTO: 247 X10*3/UL (ref 150–450)
PMV BLD AUTO: 9.5 FL (ref 7.5–11.5)
POTASSIUM SERPL-SCNC: 4.3 MMOL/L (ref 3.5–5.3)
PROTHROMBIN TIME: 20.4 SECONDS (ref 9.8–12.8)
RBC # BLD AUTO: 3.91 X10*6/UL (ref 4–5.2)
SODIUM SERPL-SCNC: 142 MMOL/L (ref 136–145)
WBC # BLD AUTO: 4.9 X10*3/UL (ref 4.4–11.3)

## 2023-10-20 PROCEDURE — 83735 ASSAY OF MAGNESIUM: CPT | Performed by: STUDENT IN AN ORGANIZED HEALTH CARE EDUCATION/TRAINING PROGRAM

## 2023-10-20 PROCEDURE — 80069 RENAL FUNCTION PANEL: CPT | Performed by: STUDENT IN AN ORGANIZED HEALTH CARE EDUCATION/TRAINING PROGRAM

## 2023-10-20 PROCEDURE — 2500000005 HC RX 250 GENERAL PHARMACY W/O HCPCS

## 2023-10-20 PROCEDURE — 96372 THER/PROPH/DIAG INJ SC/IM: CPT

## 2023-10-20 PROCEDURE — 2500000001 HC RX 250 WO HCPCS SELF ADMINISTERED DRUGS (ALT 637 FOR MEDICARE OP): Performed by: STUDENT IN AN ORGANIZED HEALTH CARE EDUCATION/TRAINING PROGRAM

## 2023-10-20 PROCEDURE — 36415 COLL VENOUS BLD VENIPUNCTURE: CPT | Performed by: STUDENT IN AN ORGANIZED HEALTH CARE EDUCATION/TRAINING PROGRAM

## 2023-10-20 PROCEDURE — 85025 COMPLETE CBC W/AUTO DIFF WBC: CPT | Mod: CMCLAB | Performed by: STUDENT IN AN ORGANIZED HEALTH CARE EDUCATION/TRAINING PROGRAM

## 2023-10-20 PROCEDURE — 99239 HOSP IP/OBS DSCHRG MGMT >30: CPT | Performed by: INTERNAL MEDICINE

## 2023-10-20 PROCEDURE — 2500000004 HC RX 250 GENERAL PHARMACY W/ HCPCS (ALT 636 FOR OP/ED)

## 2023-10-20 PROCEDURE — 2500000004 HC RX 250 GENERAL PHARMACY W/ HCPCS (ALT 636 FOR OP/ED): Performed by: STUDENT IN AN ORGANIZED HEALTH CARE EDUCATION/TRAINING PROGRAM

## 2023-10-20 PROCEDURE — 85730 THROMBOPLASTIN TIME PARTIAL: CPT | Performed by: STUDENT IN AN ORGANIZED HEALTH CARE EDUCATION/TRAINING PROGRAM

## 2023-10-20 PROCEDURE — 2500000001 HC RX 250 WO HCPCS SELF ADMINISTERED DRUGS (ALT 637 FOR MEDICARE OP)

## 2023-10-20 RX ORDER — POLYETHYLENE GLYCOL 3350 17 G/17G
34 POWDER, FOR SOLUTION ORAL DAILY
Start: 2023-10-20

## 2023-10-20 RX ORDER — CLOPIDOGREL BISULFATE 75 MG/1
75 TABLET ORAL DAILY
Start: 2023-10-20

## 2023-10-20 RX ORDER — WARFARIN 2.5 MG/1
7.5 TABLET ORAL EVERY OTHER DAY
Status: CANCELLED | COMMUNITY
Start: 2023-10-21

## 2023-10-20 RX ORDER — POLYETHYLENE GLYCOL 3350 17 G/17G
34 POWDER, FOR SOLUTION ORAL DAILY
COMMUNITY
Start: 2023-10-20 | End: 2023-10-20 | Stop reason: SDUPTHER

## 2023-10-20 RX ORDER — GABAPENTIN 400 MG/1
400 CAPSULE ORAL EVERY 8 HOURS PRN
COMMUNITY
Start: 2023-10-20

## 2023-10-20 RX ORDER — CARVEDILOL 3.12 MG/1
3.12 TABLET ORAL
Start: 2023-10-20

## 2023-10-20 RX ORDER — TRAMADOL HYDROCHLORIDE 50 MG/1
50 TABLET, FILM COATED ORAL EVERY 6 HOURS PRN
Qty: 15 TABLET | Refills: 0 | Status: SHIPPED | OUTPATIENT
Start: 2023-10-20

## 2023-10-20 RX ORDER — TALC
6 POWDER (GRAM) TOPICAL NIGHTLY
Start: 2023-10-20

## 2023-10-20 RX ORDER — ALBUTEROL SULFATE 90 UG/1
2 INHALANT RESPIRATORY (INHALATION) EVERY 6 HOURS PRN
Qty: 18 G | Refills: 11
Start: 2023-10-20

## 2023-10-20 RX ORDER — SPIRONOLACTONE 25 MG/1
25 TABLET ORAL DAILY
Start: 2023-10-20

## 2023-10-20 RX ORDER — DICLOFENAC SODIUM 10 MG/G
4 GEL TOPICAL 4 TIMES DAILY PRN
Start: 2023-10-20

## 2023-10-20 RX ORDER — WARFARIN SODIUM 5 MG/1
10 TABLET ORAL EVERY OTHER DAY
Status: CANCELLED | COMMUNITY
Start: 2023-10-20

## 2023-10-20 RX ORDER — BUPROPION HYDROCHLORIDE 150 MG/1
150 TABLET ORAL DAILY
Start: 2023-10-20

## 2023-10-20 RX ORDER — TORSEMIDE 20 MG/1
20 TABLET ORAL DAILY
Start: 2023-10-20

## 2023-10-20 RX ORDER — ARIPIPRAZOLE 10 MG/1
10 TABLET ORAL DAILY
Start: 2023-10-20

## 2023-10-20 RX ORDER — CEFTRIAXONE 2 G/50ML
2 INJECTION, SOLUTION INTRAVENOUS ONCE
Status: DISCONTINUED | OUTPATIENT
Start: 2023-10-20 | End: 2023-10-20 | Stop reason: HOSPADM

## 2023-10-20 RX ORDER — WARFARIN SODIUM 5 MG/1
7.5 TABLET ORAL NIGHTLY
COMMUNITY
Start: 2023-10-20 | End: 2023-10-20 | Stop reason: SDUPTHER

## 2023-10-20 RX ORDER — ENOXAPARIN SODIUM 150 MG/ML
1 INJECTION SUBCUTANEOUS EVERY 12 HOURS
Status: CANCELLED | COMMUNITY
Start: 2023-10-20

## 2023-10-20 RX ORDER — ATORVASTATIN CALCIUM 80 MG/1
80 TABLET, FILM COATED ORAL DAILY
COMMUNITY
Start: 2023-10-20

## 2023-10-20 RX ORDER — AMOXICILLIN 250 MG
1 CAPSULE ORAL NIGHTLY
Start: 2023-10-20

## 2023-10-20 RX ORDER — TRAMADOL HYDROCHLORIDE 50 MG/1
50 TABLET, FILM COATED ORAL EVERY 6 HOURS PRN
Qty: 15 TABLET | Refills: 0
Start: 2023-10-20 | End: 2023-10-20 | Stop reason: SDUPTHER

## 2023-10-20 RX ORDER — SULFAMETHOXAZOLE AND TRIMETHOPRIM 800; 160 MG/1; MG/1
1 TABLET ORAL 2 TIMES DAILY
Start: 2023-10-20 | End: 2023-10-25

## 2023-10-20 RX ORDER — GABAPENTIN 300 MG/1
300 CAPSULE ORAL EVERY 8 HOURS PRN
Qty: 15 CAPSULE | Refills: 1
Start: 2023-10-20

## 2023-10-20 RX ORDER — WARFARIN SODIUM 5 MG/1
7.5 TABLET ORAL NIGHTLY
Start: 2023-10-20

## 2023-10-20 RX ORDER — DICLOFENAC SODIUM 10 MG/G
4 GEL TOPICAL 4 TIMES DAILY PRN
COMMUNITY
Start: 2023-10-20 | End: 2023-10-20 | Stop reason: SDUPTHER

## 2023-10-20 RX ORDER — SULFAMETHOXAZOLE AND TRIMETHOPRIM 800; 160 MG/1; MG/1
1 TABLET ORAL 2 TIMES DAILY
Qty: 8 TABLET | Refills: 0 | Status: SHIPPED | OUTPATIENT
Start: 2023-10-21 | End: 2023-10-20 | Stop reason: SDUPTHER

## 2023-10-20 RX ORDER — ATORVASTATIN CALCIUM 80 MG/1
80 TABLET, FILM COATED ORAL NIGHTLY
Start: 2023-10-20

## 2023-10-20 RX ORDER — SULFAMETHOXAZOLE AND TRIMETHOPRIM 800; 160 MG/1; MG/1
1 TABLET ORAL 2 TIMES DAILY
Qty: 10 TABLET | Refills: 0 | COMMUNITY
Start: 2023-10-20 | End: 2023-10-20 | Stop reason: SDUPTHER

## 2023-10-20 RX ORDER — SULFAMETHOXAZOLE AND TRIMETHOPRIM 800; 160 MG/1; MG/1
1 TABLET ORAL 2 TIMES DAILY
Qty: 4 TABLET | Refills: 0 | Status: CANCELLED | COMMUNITY
Start: 2023-10-21 | End: 2023-10-23

## 2023-10-20 RX ADMIN — ACETAMINOPHEN 975 MG: 325 TABLET ORAL at 09:01

## 2023-10-20 RX ADMIN — CARVEDILOL 3.12 MG: 3.12 TABLET, FILM COATED ORAL at 09:02

## 2023-10-20 RX ADMIN — WARFARIN SODIUM 7.5 MG: 7.5 TABLET ORAL at 18:37

## 2023-10-20 RX ADMIN — CLOPIDOGREL BISULFATE 75 MG: 75 TABLET ORAL at 09:03

## 2023-10-20 RX ADMIN — TRAMADOL HYDROCHLORIDE 50 MG: 50 TABLET, COATED ORAL at 02:24

## 2023-10-20 RX ADMIN — TRAMADOL HYDROCHLORIDE 50 MG: 50 TABLET, COATED ORAL at 09:02

## 2023-10-20 RX ADMIN — ACETAMINOPHEN 975 MG: 325 TABLET ORAL at 16:27

## 2023-10-20 RX ADMIN — ENOXAPARIN SODIUM 120 MG: 150 INJECTION SUBCUTANEOUS at 09:00

## 2023-10-20 RX ADMIN — ARIPIPRAZOLE 10 MG: 10 TABLET ORAL at 09:02

## 2023-10-20 RX ADMIN — TRAMADOL HYDROCHLORIDE 50 MG: 50 TABLET, COATED ORAL at 16:28

## 2023-10-20 RX ADMIN — CARVEDILOL 3.12 MG: 3.12 TABLET, FILM COATED ORAL at 18:37

## 2023-10-20 RX ADMIN — FUROSEMIDE 40 MG: 40 TABLET ORAL at 09:02

## 2023-10-20 RX ADMIN — LIDOCAINE 1 PATCH: 4 PATCH TOPICAL at 09:03

## 2023-10-20 RX ADMIN — BUPROPION HYDROCHLORIDE 150 MG: 150 TABLET, EXTENDED RELEASE ORAL at 09:02

## 2023-10-20 RX ADMIN — POLYETHYLENE GLYCOL 3350 34 G: 17 POWDER, FOR SOLUTION ORAL at 09:01

## 2023-10-20 RX ADMIN — SPIRONOLACTONE 25 MG: 25 TABLET ORAL at 09:03

## 2023-10-20 ASSESSMENT — COGNITIVE AND FUNCTIONAL STATUS - GENERAL
DAILY ACTIVITIY SCORE: 19
TURNING FROM BACK TO SIDE WHILE IN FLAT BAD: A LITTLE
TOILETING: A LITTLE
HELP NEEDED FOR BATHING: A LITTLE
DRESSING REGULAR LOWER BODY CLOTHING: A LITTLE
MOVING TO AND FROM BED TO CHAIR: A LITTLE
PERSONAL GROOMING: A LITTLE
WALKING IN HOSPITAL ROOM: A LITTLE
DRESSING REGULAR UPPER BODY CLOTHING: A LITTLE
CLIMB 3 TO 5 STEPS WITH RAILING: A LITTLE
STANDING UP FROM CHAIR USING ARMS: A LITTLE
MOBILITY SCORE: 19

## 2023-10-20 ASSESSMENT — PAIN SCALES - GENERAL
PAINLEVEL_OUTOF10: 7
PAINLEVEL_OUTOF10: 8
PAINLEVEL_OUTOF10: 10 - WORST POSSIBLE PAIN
PAINLEVEL_OUTOF10: 10 - WORST POSSIBLE PAIN

## 2023-10-20 ASSESSMENT — PAIN - FUNCTIONAL ASSESSMENT
PAIN_FUNCTIONAL_ASSESSMENT: 0-10
PAIN_FUNCTIONAL_ASSESSMENT: 0-10

## 2023-10-20 NOTE — CARE PLAN
Problem: Fall/Injury  Goal: Not fall by end of shift  10/20/2023 0127 by Joe Baeza RN  Outcome: Progressing  10/19/2023 2203 by Joe Baeza RN  Outcome: Progressing  10/19/2023 2203 by Joe Baeza RN  Outcome: Progressing  10/19/2023 2202 by Joe Baeza RN  Outcome: Progressing  Goal: Be free from injury by end of the shift  10/20/2023 0127 by Joe Baeza RN  Outcome: Progressing  10/19/2023 2203 by Joe Baeza RN  Outcome: Progressing  10/19/2023 2203 by Joe Baeza RN  Outcome: Progressing  10/19/2023 2202 by Joe Baeza RN  Outcome: Progressing  Goal: Verbalize understanding of personal risk factors for fall in the hospital  10/20/2023 0127 by Joe Baeza RN  Outcome: Progressing  10/19/2023 2203 by Joe Baeza RN  Outcome: Progressing  10/19/2023 2203 by Joe Baeza RN  Outcome: Progressing  10/19/2023 2202 by Joe Baeza RN  Outcome: Progressing  Goal: Verbalize understanding of risk factor reduction measures to prevent injury from fall in the home  10/20/2023 0127 by Joe Baeza RN  Outcome: Progressing  10/19/2023 2203 by Joe Baeza RN  Outcome: Progressing  10/19/2023 2203 by Joe Baeza RN  Outcome: Progressing  10/19/2023 2202 by Joe Baeza RN  Outcome: Progressing  Goal: Use assistive devices by end of the shift  10/20/2023 0127 by Joe Baeza RN  Outcome: Progressing  10/19/2023 2203 by Joe Baeza RN  Outcome: Progressing  10/19/2023 2203 by Joe Baeza RN  Outcome: Progressing  10/19/2023 2202 by Joe Baeza RN  Outcome: Progressing  Goal: Pace activities to prevent fatigue by end of the shift  10/20/2023 0127 by Joe Baeza RN  Outcome: Progressing  10/19/2023 2203 by Joe Baeza RN  Outcome: Progressing  10/19/2023 2203 by Joe  Beth Omojaro, RN  Outcome: Progressing  10/19/2023 2202 by Joe Baeza RN  Outcome: Progressing     Problem: Skin  Goal: Decreased wound size/increased tissue granulation at next dressing change  10/20/2023 0127 by Joe Baeza RN  Outcome: Progressing  Flowsheets (Taken 10/20/2023 0127)  Decreased wound size/increased tissue granulation at next dressing change: Protective dressings over bony prominences  10/19/2023 2203 by Joe Baeza RN  Outcome: Progressing  10/19/2023 2203 by Joe Baeza RN  Outcome: Progressing  10/19/2023 2202 by Joe Baeza RN  Outcome: Progressing  Goal: Participates in plan/prevention/treatment measures  10/20/2023 0127 by Joe Baeza RN  Outcome: Progressing  10/19/2023 2203 by Joe Baeza RN  Outcome: Progressing  10/19/2023 2203 by Joe Baeza RN  Outcome: Progressing  10/19/2023 2202 by Joe Baeza RN  Outcome: Progressing  Goal: Prevent/manage excess moisture  10/20/2023 0127 by Joe Baeza RN  Outcome: Progressing  10/19/2023 2203 by Joe Baeza RN  Outcome: Progressing  10/19/2023 2203 by Joe Baeza RN  Outcome: Progressing  10/19/2023 2202 by Joe Baeza RN  Outcome: Progressing  Goal: Prevent/minimize sheer/friction injuries  10/20/2023 0127 by Joe Baeza RN  Outcome: Progressing  10/19/2023 2203 by Joe Baeza RN  Outcome: Progressing  10/19/2023 2203 by Joe Baeza RN  Outcome: Progressing  10/19/2023 2202 by Joe Baeza RN  Outcome: Progressing  Goal: Promote/optimize nutrition  10/20/2023 0127 by Joe Baeza RN  Outcome: Progressing  10/19/2023 2203 by Joe Baeza RN  Outcome: Progressing  10/19/2023 2203 by Joe Baeza RN  Outcome: Progressing  10/19/2023 2202 by Joe Baeza RN  Outcome: Progressing  Goal: Promote skin  healing  10/20/2023 0127 by Joe Baeza RN  Outcome: Progressing  10/19/2023 2203 by Joe Baeza RN  Outcome: Progressing  10/19/2023 2203 by Joe Baeza RN  Outcome: Progressing  10/19/2023 2202 by Joe Baeza RN  Outcome: Progressing     Problem: Pain  Goal: My pain/discomfort is manageable  10/20/2023 0127 by Joe Baeza RN  Outcome: Progressing  10/19/2023 2203 by Joe Baeza RN  Outcome: Progressing  10/19/2023 2203 by Joe Baeza RN  Outcome: Progressing  10/19/2023 2202 by Joe Baeza RN  Outcome: Progressing     Problem: Safety  Goal: Patient will be injury free during hospitalization  10/20/2023 0127 by Joe Baeza RN  Outcome: Progressing  10/19/2023 2203 by Joe Baeza RN  Outcome: Progressing  10/19/2023 2203 by Joe Baeza RN  Outcome: Progressing  10/19/2023 2202 by Joe Baeza RN  Outcome: Progressing  Goal: I will remain free of falls  10/20/2023 0127 by Joe Baeza RN  Outcome: Progressing  10/19/2023 2203 by Joe Baeza RN  Outcome: Progressing  10/19/2023 2203 by Joe Baeza RN  Outcome: Progressing  10/19/2023 2202 by Joe Baeza RN  Outcome: Progressing     Problem: Psychosocial Needs  Goal: Demonstrates ability to cope with hospitalization/illness  10/20/2023 0127 by Joe Baeza RN  Outcome: Progressing  10/19/2023 2203 by Joe Baeza RN  Outcome: Progressing  10/19/2023 2203 by Joe Baeza RN  Outcome: Progressing  Flowsheets (Taken 10/19/2023 2203)  Demonstrates ability to cope with hospitalization/illness: Encourage verbalization of feelings/concerns/expectations  10/19/2023 2202 by Joe Baeza RN  Outcome: Progressing  Goal: Collaborate with me, my family, and caregiver to identify my specific goals  10/20/2023 0127 by Joe Baeza RN  Outcome:  Progressing  10/19/2023 2203 by Joe Baeza RN  Outcome: Progressing  10/19/2023 2203 by Joe Baeza RN  Outcome: Progressing  10/19/2023 2202 by Joe Baeza RN  Outcome: Progressing     Problem: Discharge Barriers  Goal: My discharge needs are met  10/20/2023 0127 by Joe Baeza RN  Outcome: Progressing  10/19/2023 2203 by Joe Baeza RN  Outcome: Progressing  10/19/2023 2203 by Joe Baeza RN  Outcome: Progressing  10/19/2023 2202 by Joe Baeza RN  Outcome: Progressing

## 2023-10-20 NOTE — DISCHARGE INSTRUCTIONS
Dear MsBrian Spencer,    You are admitted to the hospital this time due to swelling and pain in both of your legs combined with shortness of breath. We believe that it is possibly caused by loss of home medication control for 2-3 weeks, leading to an acute exacerbation of your underlying heart failure. The excess body fluid, which was supposed to be discharged by the effect of the water pills, accumulated in your lower legs and caused the swelling, tightness and discomfort. We also did some exam for potential blood clots in your veins, and fortunately, no obvious blood clot was identified on the images. However, we did found some bacteria growing in your urine, which could be caused by urinary tract infection, so we put you on the antibiotics treatment for a total course of 5 days. Under regular medication control during the admission, you are now ready to discharge. There are several things that we want you to follow after discharge:    - complete the remaining antibiotics (Bactrim) treatment course for 4 days  - should obtain daily INR levels at SNF. To discontinue lovenox once INR is therapeutic (goal 2-3) and titrate warfarin accordingly. Is being discharged on 7.5mg nightly, requires close monitoring at SNF

## 2023-10-20 NOTE — CARE PLAN
Problem: Fall/Injury  Goal: Not fall by end of shift  10/19/2023 2203 by Joe Baeza RN  Outcome: Progressing  10/19/2023 2203 by Joe Baeza RN  Outcome: Progressing  10/19/2023 2202 by Joe Baeza RN  Outcome: Progressing  Goal: Be free from injury by end of the shift  10/19/2023 2203 by Joe Baeza RN  Outcome: Progressing  10/19/2023 2203 by Joe Baeza RN  Outcome: Progressing  10/19/2023 2202 by Joe Baeza RN  Outcome: Progressing  Goal: Verbalize understanding of personal risk factors for fall in the hospital  10/19/2023 2203 by Joe Baeza RN  Outcome: Progressing  10/19/2023 2203 by Joe Baeza RN  Outcome: Progressing  10/19/2023 2202 by Joe Baeza RN  Outcome: Progressing  Goal: Verbalize understanding of risk factor reduction measures to prevent injury from fall in the home  10/19/2023 2203 by Joe Baeza RN  Outcome: Progressing  10/19/2023 2203 by Joe Baeza RN  Outcome: Progressing  10/19/2023 2202 by Joe Baeza RN  Outcome: Progressing  Goal: Use assistive devices by end of the shift  10/19/2023 2203 by Joe Baeza RN  Outcome: Progressing  10/19/2023 2203 by Joe Baeza RN  Outcome: Progressing  10/19/2023 2202 by Joe Baeza RN  Outcome: Progressing  Goal: Pace activities to prevent fatigue by end of the shift  10/19/2023 2203 by Joe Baeza RN  Outcome: Progressing  10/19/2023 2203 by Joe Baeza RN  Outcome: Progressing  10/19/2023 2202 by Joe Baeza RN  Outcome: Progressing     Problem: Skin  Goal: Decreased wound size/increased tissue granulation at next dressing change  10/19/2023 2203 by Joe Baeza RN  Outcome: Progressing  10/19/2023 2203 by Joe Baeza RN  Outcome: Progressing  10/19/2023 2202 by Joe Baeza, DIDIER  Outcome:  Progressing  Goal: Participates in plan/prevention/treatment measures  10/19/2023 2203 by Joe Baeza RN  Outcome: Progressing  10/19/2023 2203 by Joe Baeza RN  Outcome: Progressing  10/19/2023 2202 by Joe Baeza RN  Outcome: Progressing  Goal: Prevent/manage excess moisture  10/19/2023 2203 by Joe Baeza RN  Outcome: Progressing  10/19/2023 2203 by Joe Baeza RN  Outcome: Progressing  10/19/2023 2202 by Joe Baeza RN  Outcome: Progressing  Goal: Prevent/minimize sheer/friction injuries  10/19/2023 2203 by Joe Baeza RN  Outcome: Progressing  10/19/2023 2203 by Joe Baeza RN  Outcome: Progressing  10/19/2023 2202 by Joe Baeza RN  Outcome: Progressing  Goal: Promote/optimize nutrition  10/19/2023 2203 by Joe Baeza RN  Outcome: Progressing  10/19/2023 2203 by Joe Baeza RN  Outcome: Progressing  10/19/2023 2202 by Joe Baeza RN  Outcome: Progressing  Goal: Promote skin healing  10/19/2023 2203 by Joe Baeza RN  Outcome: Progressing  10/19/2023 2203 by Joe Baeza RN  Outcome: Progressing  10/19/2023 2202 by Joe Baeza RN  Outcome: Progressing
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
## 2023-10-20 NOTE — DISCHARGE SUMMARY
Discharge Diagnosis  Acute on chronic congestive heart failure, unspecified heart failure type (CMS/HCC)    Issues Requiring Follow-Up  - complete antibiotics (Bactrim) treatment course for 5 days in total (10/20-10/24)  - should obtain daily INR levels at SNF. To discontinue lovenox once INR is therapeutic (goal 2-3) and titrate warfarin accordingly. Is being discharged on 7.5mg nightly, requires close monitoring at SNF    Test Results Pending At Discharge  Pending Labs       No current pending labs.            Hospital Course  Manuel Palmer is a 48-year-old female with the PMH of CHF (on Torsemide, Carvedilol, Spironolactone), previous TIA/NSTEMI, DVTs/PE (on Warfarin, Lovenox and Plavix), cocaine abuse, bipolar disease (on Aripiprazole & Oxcarbazepine), HTN,, DLD (on Atrovastatin), ?COPD, presenting to ED on 10/14 with shortness of breath and bilateral leg swelling and pain for 4 days. At ED, D-dimer was 1135. CXR was negative for pulmonary edema. CT PE showed no evidence of PE or PNA. Considering Patient's not taking medications for 3 weeks, the symptoms are most likely caused by acute exacerbation of CHF without medication control. TTE on 10/16 showed 50% EF and moderate AR. DVT US on 10/16 was negative for acute DVT in BLE. Symptomatic bacteruria and suspected UTI was noted on 10/20. Plan to give 5 days of PO Bactrim BID after discharge, continue diuresing w/ PO Lasix, c/w home Carvedilol and PO Coumadin 7.5 mg daily.     Patient is under Tramadol 50 mg q6h, Gabapentin 400 TID, Tylenol 975 mg TID, and Lidocaine patch for pain control at discharge.     Things to follow up after discharge:  - complete antibiotics (Bactrim) treatment course for 5 days in total (10/20-10/24)  - should obtain daily INR levels at SNF. To discontinue lovenox once INR is therapeutic (goal 2-3) and titrate warfarin accordingly. Is being discharged on 7.5mg nightly, requires close monitoring at SNF    Pertinent Physical Exam At Time of  Discharge  Physical Exam  Please refer to 10/20 Progress Note    Home Medications     Medication List      START taking these medications     acetaminophen 325 mg tablet; Commonly known as: Tylenol; Take 3 tablets   (975 mg) by mouth every 8 hours if needed for mild pain (1 - 3).   diclofenac sodium 1 % gel gel; Commonly known as: Voltaren   enoxaparin 150 mg/mL injection; Commonly known as: Lovenox; Inject 0.81   mL (121.5 mg) under the skin every 12 hours.   lidocaine 4 % patch; Place 1 patch over 12 hours on the skin once daily.   Remove & discard patch within 12 hours or as directed by MD.   OXcarbazepine 300 mg tablet; Commonly known as: Trileptal; Take 1 tablet   (300 mg) by mouth once daily at bedtime.   polyethylene glycol packet; Commonly known as: Glycolax, Miralax   sennosides-docusate sodium 8.6-50 mg tablet; Commonly known as:   Kayla-Colace; Take 1 tablet by mouth once daily at bedtime.   sulfamethoxazole-trimethoprim 800-160 mg tablet; Commonly known as:   Bactrim DS; Take 1 tablet by mouth 2 times a day for 4 days. Do not start   before October 21, 2023.; Start taking on: October 21, 2023     CHANGE how you take these medications     * atorvastatin 80 mg tablet; Commonly known as: Lipitor; Take 1 tablet   (80 mg) by mouth once daily at bedtime.; What changed: You were already   taking a medication with the same name, and this prescription was added.   Make sure you understand how and when to take each.   * atorvastatin 80 mg tablet; Commonly known as: Lipitor; What changed:   medication strength, how much to take, additional instructions   * gabapentin 300 mg capsule; Commonly known as: Neurontin; Take 1   capsule (300 mg) by mouth every 8 hours if needed (pain).; What changed:   when to take this, reasons to take this   * gabapentin 400 mg capsule; Commonly known as: Neurontin; What changed:   You were already taking a medication with the same name, and this   prescription was added. Make sure you  understand how and when to take   each.   * warfarin 10 mg tablet; Commonly known as: Coumadin; Take as directed   per After Visit Summary.; What changed: how much to take, how to take   this, additional instructions   * warfarin 5 mg tablet; Commonly known as: Coumadin; What changed:   Another medication with the same name was changed. Make sure you   understand how and when to take each.  * This list has 6 medication(s) that are the same as other medications   prescribed for you. Read the directions carefully, and ask your doctor or   other care provider to review them with you.     CONTINUE taking these medications     albuterol 90 mcg/actuation inhaler   ARIPiprazole 10 mg tablet; Commonly known as: Abilify   buPROPion  mg 24 hr tablet; Commonly known as: Wellbutrin XL   carvedilol 3.125 mg tablet; Commonly known as: Coreg   clopidogrel 75 mg tablet; Commonly known as: Plavix   melatonin 3 mg tablet   spironolactone 25 mg tablet; Commonly known as: Aldactone   torsemide 20 mg tablet; Commonly known as: Demadex     STOP taking these medications     Colace 100 mg capsule; Generic drug: docusate sodium   hydrOXYzine HCL 25 mg tablet; Commonly known as: Atarax       Outpatient Follow-Up  No future appointments.    Nuno Alberts

## 2023-10-20 NOTE — PROGRESS NOTES
10/20/23 PCN note:   precert was obtained and pt remains medically ready for dc, [facility] transport set for 6:30pm  going to [facility], [facility], pt, bed side RN and facility all notified, GR sent to facility in [facility], [place] slip w report number was provided to RN.     Anna Garcia

## 2023-10-20 NOTE — NURSING NOTE
10/20/2023: Discharge Planning Note:   Discharge instructions were read, and reviewed with patient who denied any question. Pt IV access in Left upper arm was removed and intact prior to being discharged. Patient left with all her belongings (A reusable grocery bag of clothes, cell phone and .) Pt. was transported to [facility] by [facility]. Nurse to nurse report was given to Oz Palacios LPN  
Called to start PIV, attempted x 2 unsuccessful as veins are very small and sclerosed.  Attempt made by 2nd [facility] member and also unsuccessful.  Bedside RN aware.  Patient for possible discharge this evening.  Sylvia Ocampo, RN    
none